# Patient Record
Sex: MALE | Race: WHITE | NOT HISPANIC OR LATINO | Employment: OTHER | URBAN - NONMETROPOLITAN AREA
[De-identification: names, ages, dates, MRNs, and addresses within clinical notes are randomized per-mention and may not be internally consistent; named-entity substitution may affect disease eponyms.]

---

## 2018-05-22 ENCOUNTER — OFFICE VISIT CONVERTED (OUTPATIENT)
Dept: FAMILY MEDICINE CLINIC | Age: 73
End: 2018-05-22
Attending: NURSE PRACTITIONER

## 2018-09-21 ENCOUNTER — OFFICE VISIT CONVERTED (OUTPATIENT)
Dept: FAMILY MEDICINE CLINIC | Age: 73
End: 2018-09-21
Attending: NURSE PRACTITIONER

## 2021-05-18 NOTE — PROGRESS NOTES
AshuKian 1945     Office/Outpatient Visit    Visit Date: Tue, May 22, 2018 11:31 am    Provider: Sheri Mayo N.P. (Assistant: Shelly Bustos MA)    Location: [facility]        Electronically signed by Sheri Mayo N.P. on  2018 12:13:40 PM                             SUBJECTIVE:        CC:     Miles is a 72 year old White male.  cough         HPI:         Patient complains of upper respiratory illness.  These have been present for the past 2 weeks.  The symptoms include chest congestion, dry cough, nasal congestion and yellow nasal discharge.  He has already tried to relieve the symptoms with Mucinex.  Medical history is significant for mild seasonal allergies.      ROS:     CONSTITUTIONAL:  Positive for fever ( low grade ).  2 weeks ago     E/N/T:  Positive for scratchy throat.      CARDIOVASCULAR:  Negative for chest pain, palpitations, tachycardia, orthopnea, and edema.      RESPIRATORY:  Positive for frequent wheezing.   Negative for dyspnea.          PM/FM/SH:     Last Reviewed on 2018 11:38 AM by Sheri Mayo    Past Medical History:         Hyperlipidemia    Hypertension    Myocardial Infarction         CURRENT MEDICAL PROVIDERS:    Cardiologist         PREVENTIVE HEALTH MAINTENANCE             COLONOSCOPY: with normal results Dr. Villagran     INFLUENZA VACCINE: declines, understands reason for immunization     Prevnar 13: declines, understands reason for immunization     PNEUMOCOCCAL 23 VACCINE: declines, understands reason for immunization     Td VACCINE: was last done          Surgical History:         Sinusectomy: 2016;      PTCA (Coronary Angioplasty): X 2;         Family History:     Father:  at age 58;  Myocardial Infarction     Mother:  at age 72;  COPD         Social History:     Occupation: (Self-Employed) farming     Marital Status:      Children: 2 children         Tobacco/Alcohol/Supplements:     Last Reviewed on 2018  11:34 AM by Shelly Bustos    Tobacco: He has never smoked.          Alcohol: Frequency: Socially             Immunizations:     Td adult 1/16/2012         Allergies:     Last Reviewed on 5/22/2018 11:34 AM by Shelly Bustos    Levaquin:        Current Medications:     Last Reviewed on 5/22/2018 11:44 AM by Sheri Mayo    Aspirin (ASA) 81mg Chewable Tablet Chew 1 tablet(s) by mouth qam     Crestor 40mg Tablet Take 1 tablet(s) by mouth daily     b12     Metoprolol Tartrate 25mg Tablet 1/2 tab 2x daily     Zetia 10mg Tablet Take 1 tablet(s) by mouth daily         OBJECTIVE:        Vitals:         Current: 5/22/2018 11:33:50 AM    Ht:  5 ft, 4.75 in;  Wt: 188 lbs;  BMI: 31.5    T: 98.1 F (oral);  BP: 127/60 mm Hg (left arm, sitting);  P: 68 bpm (left arm (BP Cuff), sitting);  sCr: 0.82 mg/dL;  GFR: 78.56        Exams:     PHYSICAL EXAM:     GENERAL:  well developed and nourished; appropriately groomed; in no apparent distress;     E/N/T: EARS:  normal external auditory canals and tympanic membranes;  grossly normal hearing; NOSE: bilateral maxillary sinus tenderness present; OROPHARYNX:  normal mucosa, dentition, gingiva, and posterior pharynx;     RESPIRATORY: normal respiratory rate and pattern with no distress; diffuse inspiratory and expiratory wheezes; heard faintly     CARDIOVASCULAR: normal rate; rhythm is regular;  no systolic murmur;     LYMPHATIC: no enlargement of cervical or facial nodes;         ASSESSMENT           466.0   J20.8  Bronchitis, acute              DDx:         ORDERS:         Meds Prescribed:       Zithromax (Azithromycin) 250mg Tablet 2 tablets on day 1, then 1 tablet on days 2-5.  #6 (Six) tablet(s) Refills: 0       Medrol (Methylprednisolone) 4mg Dosepak Take as directed with food  #1 (One) dose pack Refills: 0                 PLAN:          Bronchitis, acute switch to mucinex DM, discussed allergen exposure and avoidance, he is a farmer, consider an inhaler if needed, he has  never used one          RECOMMENDATIONS given include: rest and increase oral fluid intake.      FOLLOW-UP: Advised to call if there is no improvement in 4 day(s).            Prescriptions:       Zithromax (Azithromycin) 250mg Tablet 2 tablets on day 1, then 1 tablet on days 2-5.  #6 (Six) tablet(s) Refills: 0       Medrol (Methylprednisolone) 4mg Dosepak Take as directed with food  #1 (One) dose pack Refills: 0             CHARGE CAPTURE           **Please note: ICD descriptions below are intended for billing purposes only and may not represent clinical diagnoses**        Primary Diagnosis:         466.0 Bronchitis, acute            J20.8    Acute bronchitis due to other specified organisms              Orders:          98647   Office/outpatient visit; established patient, level 3  (In-House)

## 2021-05-18 NOTE — PROGRESS NOTES
Kian Wakefield 1945     Office/Outpatient Visit    Visit Date: Fri, Sep 21, 2018 09:45 am    Provider: Rizwan Fajardo N.P. (Assistant: Viridiana Woods MA)    Location: [facility]        Electronically signed by Rizwan Fajardo N.P. on  09/21/2018 10:19:40 AM                             SUBJECTIVE:        CC:     Miles is a 72 year old White male.  presents today due to low back pain flare up         HPI:         Back pain noted.  Reason for visit: Pain.  The discomfort is most prominent in the lower, left lumbar spine.  This radiates to the left anterior thigh.  He characterizes it as mild in severity, moderate in intensity, aching, and shooting.  This is a chronic, but intermittent problem with an acute exacerbation.  He states that the current episode of pain started 2 weeks ago.  He does not recall any precipitating event or injury.  Aggravating factors contributing to the back pain may be pushing a heavy object and pulling a load.  Associated symptoms include radicular leg pain.  He denies fever, numbness in the arms, numbness in the legs, radicular arm pain, sexual dysfunction, weakness in the arms or weakness of the legs.  Medical history is significant for MRI of neck and back about 1 year ago at [facility] , was told he needed back and neck surgery at the time but never followed up on it.      ROS:     CONSTITUTIONAL:  Negative for chills, fatigue, fever and weight change.      CARDIOVASCULAR:  Negative for chest pain, orthopnea, paroxysmal nocturnal dyspnea and pedal edema.      RESPIRATORY:  Negative for dyspnea and cough.      GASTROINTESTINAL:  Negative for abdominal pain, heartburn, constipation, diarrhea, and stool changes.      MUSCULOSKELETAL:  Positive for back pain ( chronic; recurrent ).      PSYCHIATRIC:  Negative for anxiety and depression.          PMH/FMH/SH:     Last Reviewed on 9/21/2018 10:03 AM by Rizwan Fajardo    Past Medical History:             PAST MEDICAL  HISTORY         Hyperlipidemia    Hypertension    Myocardial Infarction    heart murmur since 18 y/o         CURRENT MEDICAL PROVIDERS:    Cardiologist         PREVENTIVE HEALTH MAINTENANCE             COLORECTAL CANCER SCREENING: Up to date (colonoscopy q10y; sigmoidoscopy q5y; Cologuard q3y) was last done 2015, Results are in chart; colonoscopy with the following abnormalities noted-- diverticulosis         Surgical History:         Sinusectomy: 2016;      PTCA (Coronary Angioplasty): X 2;         Family History:     Father:  at age 58;  Myocardial Infarction     Mother:  at age 72;  COPD         Social History:     Occupation: (Self-Employed) [employer]     Marital Status:      Children: 2 children         Tobacco/Alcohol/Supplements:     Last Reviewed on 2018 10:03 AM by Rizwan Fajardo    Tobacco: He has never smoked.          Alcohol: Frequency: Socially             Current Problems:     Last Reviewed on 2018 10:03 AM by Rizwan Fajardo    Hyperlipidemia     Erectile dysfunction     CAD         Immunizations:     Td adult 2012         Allergies:     Last Reviewed on 2018 10:03 AM by Rizwan Fajardo    Levaquin:        Current Medications:     Last Reviewed on 2018 10:03 AM by Rizwan Fajardo    Aspirin (ASA) 81mg Chewable Tablet Chew 1 tablet(s) by mouth qam     Crestor 40mg Tablet Take 1 tablet(s) by mouth daily     b12     Metoprolol Tartrate 25mg Tablet 1/2 tab 2x daily     Zetia 10mg Tablet Take 1 tablet(s) by mouth daily         OBJECTIVE:        Vitals:         Current: 2018 9:50:49 AM    Ht:  5 ft, 4.75 in;  Wt: 185 lbs;  BMI: 31.0    T: 98.3 F (oral);  BP: 119/64 mm Hg (right arm, sitting);  P: 66 bpm (right arm (BP Cuff), sitting);  sCr: 0.82 mg/dL;  GFR: 78.02        Exams:     PHYSICAL EXAM:     GENERAL: Vitals recorded well developed, well nourished;  well groomed;  no apparent distress;     RESPIRATORY: normal respiratory rate and pattern  with no distress; normal breath sounds with no rales, rhonchi, wheezes or rubs;     CARDIOVASCULAR: normal rate; rhythm is regular;  normal S1; normal S2; no systolic murmur; no cyanosis; no edema;     MUSCULOSKELETAL: Low back pain, SLR Positivie 75 degrees left leg, Right SLR neg. DTR wnl;     PSYCHIATRIC:  appropriate affect and demeanor; normal speech pattern; grossly normal memory;         ASSESSMENT:           724.5   M54.5  Back pain              DDx:         ORDERS:         Meds Prescribed:       Prednisone 10mg Tablet 3 qd x 3 days, then 2 qd x 3 dasy, then 1 qd x 3 days.  #18 (Eighteen) tablet(s) Refills: 0       Baclofen 10mg Tablet 1 tab po TID prn for pain/muscle pain  #60 (Sixty) tablet(s) Refills: 1                 PLAN:          Back pain Will get copy of MRI of C and L spine and make recommendations from there, may need to see [facility] or [facility] pain center for spinal injections dmt         RECOMMENDATIONS given include: Further recommendation to be given after test results are complete.      FOLLOW-UP: Schedule follow-up appointments on a p.r.n. basis..            Prescriptions:       Prednisone 10mg Tablet 3 qd x 3 days, then 2 qd x 3 dasy, then 1 qd x 3 days.  #18 (Eighteen) tablet(s) Refills: 0       Baclofen 10mg Tablet 1 tab po TID prn for pain/muscle pain  #60 (Sixty) tablet(s) Refills: 1             Patient Recommendations:        For  Back pain:     Schedule follow-up appointments as needed.                APPOINTMENT INFORMATION:        Monday Tuesday Wednesday Thursday Friday Saturday Sunday            Time:___________________AM  PM   Date:_____________________             CHARGE CAPTURE:           Primary Diagnosis:     724.5 Back pain            M54.5    Low back pain              Orders:          [id number]   Office/outpatient visit; established patient, level 3  (In-House)

## 2021-07-01 VITALS
BODY MASS INDEX: 30.82 KG/M2 | SYSTOLIC BLOOD PRESSURE: 119 MMHG | WEIGHT: 185 LBS | DIASTOLIC BLOOD PRESSURE: 64 MMHG | HEART RATE: 66 BPM | TEMPERATURE: 98.3 F | HEIGHT: 65 IN

## 2021-07-01 VITALS
HEIGHT: 65 IN | TEMPERATURE: 98.1 F | HEART RATE: 68 BPM | SYSTOLIC BLOOD PRESSURE: 127 MMHG | DIASTOLIC BLOOD PRESSURE: 60 MMHG | WEIGHT: 188 LBS | BODY MASS INDEX: 31.32 KG/M2

## 2022-02-22 ENCOUNTER — OFFICE VISIT (OUTPATIENT)
Dept: FAMILY MEDICINE CLINIC | Age: 77
End: 2022-02-22

## 2022-02-22 ENCOUNTER — LAB (OUTPATIENT)
Dept: LAB | Facility: HOSPITAL | Age: 77
End: 2022-02-22

## 2022-02-22 VITALS
HEART RATE: 54 BPM | TEMPERATURE: 98 F | SYSTOLIC BLOOD PRESSURE: 148 MMHG | DIASTOLIC BLOOD PRESSURE: 64 MMHG | WEIGHT: 183 LBS | HEIGHT: 65 IN | BODY MASS INDEX: 30.49 KG/M2

## 2022-02-22 DIAGNOSIS — S69.91XA INJURY OF FINGER OF RIGHT HAND, INITIAL ENCOUNTER: ICD-10-CM

## 2022-02-22 DIAGNOSIS — Z12.5 SCREENING FOR MALIGNANT NEOPLASM OF PROSTATE: ICD-10-CM

## 2022-02-22 DIAGNOSIS — Z00.00 ROUTINE GENERAL MEDICAL EXAMINATION AT A HEALTH CARE FACILITY: Primary | ICD-10-CM

## 2022-02-22 DIAGNOSIS — Z11.59 SCREENING FOR VIRAL DISEASE: ICD-10-CM

## 2022-02-22 DIAGNOSIS — Z12.11 SCREENING FOR COLON CANCER: ICD-10-CM

## 2022-02-22 DIAGNOSIS — Z23 ENCOUNTER FOR IMMUNIZATION: ICD-10-CM

## 2022-02-22 DIAGNOSIS — Z13.6 SCREENING FOR CARDIOVASCULAR CONDITION: ICD-10-CM

## 2022-02-22 DIAGNOSIS — H92.01 OTALGIA, RIGHT: ICD-10-CM

## 2022-02-22 PROBLEM — Z95.5 STENTED CORONARY ARTERY: Status: ACTIVE | Noted: 2021-02-08

## 2022-02-22 PROBLEM — I21.9 MYOCARDIAL INFARCTION: Status: ACTIVE | Noted: 2022-02-22

## 2022-02-22 PROBLEM — I25.10 ARTERIOSCLEROSIS OF CORONARY ARTERY: Status: ACTIVE | Noted: 2022-02-22

## 2022-02-22 PROBLEM — E78.5 HYPERLIPIDEMIA: Status: ACTIVE | Noted: 2022-02-22

## 2022-02-22 PROBLEM — J30.2 SEASONAL ALLERGIC RHINITIS: Status: ACTIVE | Noted: 2022-02-22

## 2022-02-22 LAB
ALBUMIN SERPL-MCNC: 5 G/DL (ref 3.5–5.2)
ALBUMIN/GLOB SERPL: 2.8 G/DL
ALP SERPL-CCNC: 65 U/L (ref 39–117)
ALT SERPL W P-5'-P-CCNC: 29 U/L (ref 1–41)
ANION GAP SERPL CALCULATED.3IONS-SCNC: 12.9 MMOL/L (ref 5–15)
AST SERPL-CCNC: 21 U/L (ref 1–40)
BASOPHILS # BLD AUTO: 0.03 10*3/MM3 (ref 0–0.2)
BASOPHILS NFR BLD AUTO: 0.4 % (ref 0–1.5)
BILIRUB SERPL-MCNC: 0.6 MG/DL (ref 0–1.2)
BUN SERPL-MCNC: 11 MG/DL (ref 8–23)
BUN/CREAT SERPL: 15.9 (ref 7–25)
CALCIUM SPEC-SCNC: 9.9 MG/DL (ref 8.6–10.5)
CHLORIDE SERPL-SCNC: 100 MMOL/L (ref 98–107)
CHOLEST SERPL-MCNC: 158 MG/DL (ref 0–200)
CO2 SERPL-SCNC: 26.1 MMOL/L (ref 22–29)
CREAT SERPL-MCNC: 0.69 MG/DL (ref 0.76–1.27)
DEPRECATED RDW RBC AUTO: 44.9 FL (ref 37–54)
EOSINOPHIL # BLD AUTO: 0.34 10*3/MM3 (ref 0–0.4)
EOSINOPHIL NFR BLD AUTO: 4.6 % (ref 0.3–6.2)
ERYTHROCYTE [DISTWIDTH] IN BLOOD BY AUTOMATED COUNT: 13.3 % (ref 12.3–15.4)
GFR SERPL CREATININE-BSD FRML MDRD: 112 ML/MIN/1.73
GLOBULIN UR ELPH-MCNC: 1.8 GM/DL
GLUCOSE SERPL-MCNC: 79 MG/DL (ref 65–99)
HCT VFR BLD AUTO: 47.5 % (ref 37.5–51)
HDLC SERPL-MCNC: 71 MG/DL (ref 40–60)
HGB BLD-MCNC: 15.5 G/DL (ref 13–17.7)
IMM GRANULOCYTES # BLD AUTO: 0.01 10*3/MM3 (ref 0–0.05)
IMM GRANULOCYTES NFR BLD AUTO: 0.1 % (ref 0–0.5)
LDLC SERPL CALC-MCNC: 67 MG/DL (ref 0–100)
LDLC/HDLC SERPL: 0.91 {RATIO}
LYMPHOCYTES # BLD AUTO: 3.1 10*3/MM3 (ref 0.7–3.1)
LYMPHOCYTES NFR BLD AUTO: 41.6 % (ref 19.6–45.3)
MCH RBC QN AUTO: 29.4 PG (ref 26.6–33)
MCHC RBC AUTO-ENTMCNC: 32.6 G/DL (ref 31.5–35.7)
MCV RBC AUTO: 90.1 FL (ref 79–97)
MONOCYTES # BLD AUTO: 0.69 10*3/MM3 (ref 0.1–0.9)
MONOCYTES NFR BLD AUTO: 9.2 % (ref 5–12)
NEUTROPHILS NFR BLD AUTO: 3.29 10*3/MM3 (ref 1.7–7)
NEUTROPHILS NFR BLD AUTO: 44.1 % (ref 42.7–76)
PLATELET # BLD AUTO: 280 10*3/MM3 (ref 140–450)
PMV BLD AUTO: 8.9 FL (ref 6–12)
POTASSIUM SERPL-SCNC: 3.9 MMOL/L (ref 3.5–5.2)
PROT SERPL-MCNC: 6.8 G/DL (ref 6–8.5)
RBC # BLD AUTO: 5.27 10*6/MM3 (ref 4.14–5.8)
SODIUM SERPL-SCNC: 139 MMOL/L (ref 136–145)
TRIGL SERPL-MCNC: 112 MG/DL (ref 0–150)
VLDLC SERPL-MCNC: 20 MG/DL (ref 5–40)
WBC NRBC COR # BLD: 7.46 10*3/MM3 (ref 3.4–10.8)

## 2022-02-22 PROCEDURE — 99213 OFFICE O/P EST LOW 20 MIN: CPT | Performed by: NURSE PRACTITIONER

## 2022-02-22 PROCEDURE — 90670 PCV13 VACCINE IM: CPT | Performed by: NURSE PRACTITIONER

## 2022-02-22 PROCEDURE — 85025 COMPLETE CBC W/AUTO DIFF WBC: CPT

## 2022-02-22 PROCEDURE — G0438 PPPS, INITIAL VISIT: HCPCS | Performed by: NURSE PRACTITIONER

## 2022-02-22 PROCEDURE — G0009 ADMIN PNEUMOCOCCAL VACCINE: HCPCS | Performed by: NURSE PRACTITIONER

## 2022-02-22 PROCEDURE — 80053 COMPREHEN METABOLIC PANEL: CPT

## 2022-02-22 PROCEDURE — 36415 COLL VENOUS BLD VENIPUNCTURE: CPT

## 2022-02-22 PROCEDURE — 80061 LIPID PANEL: CPT

## 2022-02-22 PROCEDURE — 1159F MED LIST DOCD IN RCRD: CPT | Performed by: NURSE PRACTITIONER

## 2022-02-22 PROCEDURE — 1170F FXNL STATUS ASSESSED: CPT | Performed by: NURSE PRACTITIONER

## 2022-02-22 RX ORDER — ROSUVASTATIN CALCIUM 40 MG/1
40 TABLET, COATED ORAL DAILY
COMMUNITY

## 2022-02-22 RX ORDER — ASPIRIN 81 MG/1
81 TABLET ORAL DAILY
COMMUNITY

## 2022-02-22 RX ORDER — ZINC GLUCONATE 50 MG
1 TABLET ORAL DAILY
COMMUNITY

## 2022-02-22 RX ORDER — EZETIMIBE 10 MG/1
1 TABLET ORAL DAILY
COMMUNITY
Start: 2022-01-10

## 2022-02-22 NOTE — ASSESSMENT & PLAN NOTE
Hypertension is unchanged.  Continue current treatment regimen.  Continue current medications.  Blood pressure will be reassessed follow up with cardiology as recommended .

## 2022-02-22 NOTE — PROGRESS NOTES
The ABCs of the Annual Wellness Visit  Subsequent Medicare Wellness Visit    Chief Complaint   Patient presents with   • Establish Care     labs   • Earache     right ear pain    • Medicare Wellness-subsequent      Subjective    History of Present Illness:  Kian Wakefield is a 76 y.o. male who presents for a Subsequent Medicare Wellness Visit.    The following portions of the patient's history were reviewed and   updated as appropriate: allergies, current medications, past family history, past medical history, past social history, past surgical history and problem list.    Compared to one year ago, the patient feels his physical   health is the same.    Compared to one year ago, the patient feels his mental   health is the same.      Regarding otalgia, has been off and on for a few weeks.  There is no change in hearing , only feeling like pressure in his ear.  He does have some occasional seasonal allergies.  No trauma or previous ear complaints.  No drainage    Mr. Wakefield reports that about a month ago, he stuck a twig into his finger and smashed his finger in between 2 logs while trying to move  and has been having pain since that time.  He does have pain running down into the hand and lower arm with certain movements of the finger.  He is concerned that he has an infection.  He denies any redness, warmth or drainage.  No open areas are noted.      Recent Hospitalizations:  He was not admitted to the hospital during the last year.       Current Medical Providers:  Patient Care Team:  Raissa Patiño APRN as PCP - General (Nurse Practitioner)  Oral Poe MD as Consulting Physician (Cardiology)    Outpatient Medications Prior to Visit   Medication Sig Dispense Refill   • aspirin 81 MG EC tablet Take 81 mg by mouth Daily.     • cyanocobalamin (VITAMIN B-12) 1000 MCG tablet Take 1 tablet by mouth Daily.     • ezetimibe (ZETIA) 10 MG tablet Take 1 tablet by mouth Daily.     • metoprolol tartrate (LOPRESSOR) 25  "MG tablet 1/2 tablet daily     • rosuvastatin (CRESTOR) 40 MG tablet Take 40 mg by mouth Daily.     • Zinc 50 MG tablet Take 1 tablet by mouth Daily.     • Aspirin Buf,CaCarb-MgCarb-MgO, 81 MG tablet Take 1 tablet by mouth Daily.       No facility-administered medications prior to visit.       No opioid medication identified on active medication list. I have reviewed chart for other potential  high risk medication/s and harmful drug interactions in the elderly.          Aspirin is on active medication list. Aspirin use is indicated based on review of current medical condition/s. Pros and cons of this therapy have been discussed today. Benefits of this medication outweigh potential harm.  Patient has been encouraged to continue taking this medication.  .      Patient Active Problem List   Diagnosis   • Atherosclerotic heart disease of native coronary artery without angina pectoris   • Arteriosclerosis of coronary artery   • Hyperlipidemia   • Hypercholesterolemia   • Hypertension   • Myocardial infarction (HCC)   • Seasonal allergic rhinitis   • Stented coronary artery     Advance Care Planning  Advance Directive is not on file.  ACP discussion was held with the patient during this visit. Patient does not have an advance directive, information provided.    Review of Systems   Constitutional: Negative for fatigue.   HENT: Positive for ear pain (right).    Eyes: Negative for visual disturbance.   Respiratory: Negative for shortness of breath.    Cardiovascular: Negative for chest pain.   Gastrointestinal: Negative for abdominal pain, constipation and diarrhea.   Musculoskeletal: Positive for arthralgias (right middle finger post traumatic ).        Objective    Vitals:    02/22/22 1323 02/22/22 1331   BP: 162/65 148/64   BP Location: Left arm Left arm   Patient Position: Sitting Sitting   Pulse: 56 54   Temp: 98 °F (36.7 °C)    Weight: 83 kg (183 lb)    Height: 164.5 cm (64.76\")      BMI Readings from Last 1 " Encounters:   02/22/22 30.68 kg/m²   BMI is above normal parameters. Recommendations include: educational material    Does the patient have evidence of cognitive impairment? No    Physical Exam  Vitals reviewed.   Constitutional:       Appearance: Normal appearance.   HENT:      Head: Normocephalic.      Right Ear: Tympanic membrane is scarred.   Eyes:      Pupils: Pupils are equal, round, and reactive to light.   Cardiovascular:      Rate and Rhythm: Normal rate and regular rhythm.      Heart sounds: No murmur heard.      Pulmonary:      Effort: Pulmonary effort is normal.      Breath sounds: Normal breath sounds.   Musculoskeletal:         General: Normal range of motion.   Neurological:      Mental Status: He is alert.   Psychiatric:         Mood and Affect: Mood normal.         Behavior: Behavior normal.       Lab Results   Component Value Date    TRIG 112 02/22/2022    HDL 71 (H) 02/22/2022    LDL 67 02/22/2022    VLDL 20 02/22/2022            HEALTH RISK ASSESSMENT    Smoking Status:  Social History     Tobacco Use   Smoking Status Never Smoker   Smokeless Tobacco Never Used     Alcohol Consumption:  Social History     Substance and Sexual Activity   Alcohol Use Yes    Comment: whiskey nightly   - 2 drinks per night     Fall Risk Screen:    STEADI Fall Risk Assessment was completed, and patient is at LOW risk for falls.Assessment completed on:2/22/2022    Depression Screening:  PHQ-2/PHQ-9 Depression Screening 2/22/2022   Little interest or pleasure in doing things 0   Feeling down, depressed, or hopeless 0   Total Score 0       Health Habits and Functional and Cognitive Screening:  No flowsheet data found.    Age-appropriate Screening Schedule:  Refer to the list below for future screening recommendations based on patient's age, sex and/or medical conditions. Orders for these recommended tests are listed in the plan section. The patient has been provided with a written plan.    Health Maintenance   Topic Date  Due   • ZOSTER VACCINE (1 of 2) Never done   • INFLUENZA VACCINE  02/22/2023 (Originally 8/1/2021)   • LIPID PANEL  02/22/2023   • TDAP/TD VACCINES (2 - Td or Tdap) 02/22/2031              Assessment/Plan   CMS Preventative Services Quick Reference  Risk Factors Identified During Encounter  Alcohol Misuse  Cardiovascular Disease  Chronic Pain   Hearing Problem  Immunizations Discussed/Encouraged (specific Immunizations; Influenza and Shingrix  The above risks/problems have been discussed with the patient.  Follow up actions/plans if indicated are seen below in the Assessment/Plan Section.  Pertinent information has been shared with the patient in the After Visit Summary.    Diagnoses and all orders for this visit:    1. Routine general medical examination at a health care facility (Primary)  Comments:  diet and exercise, vaccines discussed and administered as allowed by Mr. Wakefield  Annual wellness visits recommended     2. Injury of finger of right hand, initial encounter  Comments:  no evidence of infection - would suspect tendonitis vs possible crush/nerve damage  declines referral at this time pending lab results   Orders:  -     CBC & Differential; Future    3. Otalgia, right  Comments:  no evidence of infection   would recommend that he use OTC antihistamines and nasal sprays in short term - follow up if no improvement or change in symptom    4. Screening for cardiovascular condition  -     Comprehensive Metabolic Panel; Future  -     Lipid Panel; Future    5. Encounter for immunization  -     Pneumococcal Conjugate Vaccine 13-Valent All    6. Screening for viral disease    7. Screening for colon cancer  -     Ambulatory Referral For Screening Colonoscopy    8. Screening for malignant neoplasm of prostate  -     PSA SCREENING; Future        Follow Up:   Return in about 1 year (around 2/22/2023), or if symptoms worsen or fail to improve, for Medicare Wellness.     An After Visit Summary and PPPS were made  available to the patient.

## 2022-03-02 ENCOUNTER — TELEPHONE (OUTPATIENT)
Dept: FAMILY MEDICINE CLINIC | Age: 77
End: 2022-03-02

## 2022-03-02 NOTE — TELEPHONE ENCOUNTER
jamila     Caller: Kian Wakefield    Relationship: Self    Best call back number: [phone] PATIENT [phone]    What is the best time to reach you: ANY     Who are you requesting to speak with (clinical staff, provider,  specific staff member):CLINICAL         What was the call regarding: WIFE CALLED IN STATING THAT SHE  RECEIVED A CALL FROM THE [facility] [phone] AND WHEN SHE CALLED BACK THE HOSPITAL WAS UNABLE TO ASSIST HER BECAUSE THEY DIDN'T HAVE ANY IDEA AS TO WHY SHE WAS CALLING     WIFE THINKS IT IS REGARDING A CONSULTATION OR ABOUT A COLONOSCOPY. WIFE IS REQUESTING A PHONE NUMBER OF WHO NEEDS TO BE CONTACTED REGARDING THIS.     Do you require a callback: YES         MARCO ANTONIO WAKEFIELD IS ON THE  VERBAL.

## 2022-03-02 NOTE — TELEPHONE ENCOUNTER
Patient has been informed to call Dr. Juan Lundberg's office [phone] opt #1 to [facility]. Consultation.    brittney

## 2022-04-26 ENCOUNTER — TELEPHONE (OUTPATIENT)
Dept: FAMILY MEDICINE CLINIC | Age: 77
End: 2022-04-26

## 2022-05-02 ENCOUNTER — LAB (OUTPATIENT)
Dept: LAB | Facility: HOSPITAL | Age: 77
End: 2022-05-02

## 2022-05-02 DIAGNOSIS — Z12.5 SCREENING FOR MALIGNANT NEOPLASM OF PROSTATE: ICD-10-CM

## 2022-05-02 LAB — PSA SERPL-MCNC: 4.79 NG/ML (ref 0–4)

## 2022-05-02 PROCEDURE — G0103 PSA SCREENING: HCPCS

## 2022-05-02 PROCEDURE — 36415 COLL VENOUS BLD VENIPUNCTURE: CPT

## 2023-02-28 ENCOUNTER — OFFICE VISIT (OUTPATIENT)
Dept: FAMILY MEDICINE CLINIC | Age: 78
End: 2023-02-28
Payer: MEDICARE

## 2023-02-28 ENCOUNTER — TELEPHONE (OUTPATIENT)
Dept: FAMILY MEDICINE CLINIC | Age: 78
End: 2023-02-28
Payer: MEDICARE

## 2023-02-28 ENCOUNTER — LAB (OUTPATIENT)
Dept: LAB | Facility: HOSPITAL | Age: 78
End: 2023-02-28
Payer: MEDICARE

## 2023-02-28 VITALS
TEMPERATURE: 98.2 F | SYSTOLIC BLOOD PRESSURE: 150 MMHG | HEIGHT: 65 IN | WEIGHT: 178.2 LBS | BODY MASS INDEX: 29.69 KG/M2 | HEART RATE: 63 BPM | OXYGEN SATURATION: 97 % | DIASTOLIC BLOOD PRESSURE: 58 MMHG

## 2023-02-28 DIAGNOSIS — Z11.59 SCREENING FOR VIRAL DISEASE: ICD-10-CM

## 2023-02-28 DIAGNOSIS — Z00.00 MEDICARE ANNUAL WELLNESS VISIT, SUBSEQUENT: Primary | ICD-10-CM

## 2023-02-28 DIAGNOSIS — Z13.6 SCREENING FOR CARDIOVASCULAR CONDITION: ICD-10-CM

## 2023-02-28 DIAGNOSIS — N52.9 ERECTILE DYSFUNCTION, UNSPECIFIED ERECTILE DYSFUNCTION TYPE: ICD-10-CM

## 2023-02-28 DIAGNOSIS — Z79.82 LONG TERM CURRENT USE OF ASPIRIN: ICD-10-CM

## 2023-02-28 DIAGNOSIS — Z23 ENCOUNTER FOR IMMUNIZATION: ICD-10-CM

## 2023-02-28 DIAGNOSIS — I10 HYPERTENSION, UNSPECIFIED TYPE: ICD-10-CM

## 2023-02-28 LAB
ALBUMIN SERPL-MCNC: 4.6 G/DL (ref 3.5–5.2)
ALBUMIN/GLOB SERPL: 1.8 G/DL
ALP SERPL-CCNC: 65 U/L (ref 39–117)
ALT SERPL W P-5'-P-CCNC: 21 U/L (ref 1–41)
ANION GAP SERPL CALCULATED.3IONS-SCNC: 9 MMOL/L (ref 5–15)
AST SERPL-CCNC: 24 U/L (ref 1–40)
BILIRUB SERPL-MCNC: 0.6 MG/DL (ref 0–1.2)
BUN SERPL-MCNC: 12 MG/DL (ref 8–23)
BUN/CREAT SERPL: 13.8 (ref 7–25)
CALCIUM SPEC-SCNC: 9.7 MG/DL (ref 8.6–10.5)
CHLORIDE SERPL-SCNC: 101 MMOL/L (ref 98–107)
CHOLEST SERPL-MCNC: 150 MG/DL (ref 0–200)
CO2 SERPL-SCNC: 29 MMOL/L (ref 22–29)
CREAT SERPL-MCNC: 0.87 MG/DL (ref 0.76–1.27)
DEPRECATED RDW RBC AUTO: 44.9 FL (ref 37–54)
EGFRCR SERPLBLD CKD-EPI 2021: 90 ML/MIN/1.73
ERYTHROCYTE [DISTWIDTH] IN BLOOD BY AUTOMATED COUNT: 13.5 % (ref 12.3–15.4)
GLOBULIN UR ELPH-MCNC: 2.5 GM/DL
GLUCOSE SERPL-MCNC: 95 MG/DL (ref 65–99)
HCT VFR BLD AUTO: 45.6 % (ref 37.5–51)
HCV AB SER DONR QL: NORMAL
HDLC SERPL-MCNC: 70 MG/DL (ref 40–60)
HGB BLD-MCNC: 14.9 G/DL (ref 13–17.7)
LDLC SERPL CALC-MCNC: 62 MG/DL (ref 0–100)
LDLC/HDLC SERPL: 0.85 {RATIO}
MCH RBC QN AUTO: 29.3 PG (ref 26.6–33)
MCHC RBC AUTO-ENTMCNC: 32.7 G/DL (ref 31.5–35.7)
MCV RBC AUTO: 89.8 FL (ref 79–97)
PLATELET # BLD AUTO: 296 10*3/MM3 (ref 140–450)
PMV BLD AUTO: 8.2 FL (ref 6–12)
POTASSIUM SERPL-SCNC: 4.6 MMOL/L (ref 3.5–5.2)
PROT SERPL-MCNC: 7.1 G/DL (ref 6–8.5)
RBC # BLD AUTO: 5.08 10*6/MM3 (ref 4.14–5.8)
SODIUM SERPL-SCNC: 139 MMOL/L (ref 136–145)
TRIGL SERPL-MCNC: 102 MG/DL (ref 0–150)
VLDLC SERPL-MCNC: 18 MG/DL (ref 5–40)
WBC NRBC COR # BLD: 7.94 10*3/MM3 (ref 3.4–10.8)

## 2023-02-28 PROCEDURE — 85027 COMPLETE CBC AUTOMATED: CPT

## 2023-02-28 PROCEDURE — 80061 LIPID PANEL: CPT

## 2023-02-28 PROCEDURE — G0009 ADMIN PNEUMOCOCCAL VACCINE: HCPCS | Performed by: NURSE PRACTITIONER

## 2023-02-28 PROCEDURE — 90677 PCV20 VACCINE IM: CPT | Performed by: NURSE PRACTITIONER

## 2023-02-28 PROCEDURE — 1159F MED LIST DOCD IN RCRD: CPT | Performed by: NURSE PRACTITIONER

## 2023-02-28 PROCEDURE — 1170F FXNL STATUS ASSESSED: CPT | Performed by: NURSE PRACTITIONER

## 2023-02-28 PROCEDURE — 80053 COMPREHEN METABOLIC PANEL: CPT

## 2023-02-28 PROCEDURE — 36415 COLL VENOUS BLD VENIPUNCTURE: CPT

## 2023-02-28 PROCEDURE — 86803 HEPATITIS C AB TEST: CPT

## 2023-02-28 PROCEDURE — G0439 PPPS, SUBSEQ VISIT: HCPCS | Performed by: NURSE PRACTITIONER

## 2023-02-28 RX ORDER — NITROGLYCERIN 0.4 MG/1
0.4 TABLET SUBLINGUAL
COMMUNITY
Start: 2023-02-20 | End: 2024-02-20

## 2023-02-28 RX ORDER — SILDENAFIL 50 MG/1
25-50 TABLET, FILM COATED ORAL DAILY PRN
Qty: 20 TABLET | Refills: 1 | Status: SHIPPED | OUTPATIENT
Start: 2023-02-28

## 2023-02-28 NOTE — PROGRESS NOTES
The ABCs of the Annual Wellness Visit  Subsequent Medicare Wellness Visit    Subjective      Kian Wakefield is a 77 y.o. male who presents for a Subsequent Medicare Wellness Visit.    The following portions of the patient's history were reviewed and   updated as appropriate: allergies, current medications, past family history, past medical history, past social history, past surgical history and problem list.    Compared to one year ago, the patient feels his physical   health is better.    Compared to one year ago, the patient feels his mental   health is better.    Is requesting refill on viagra- has been some time since refilled - his MI is from 2003  Overall cardiac conditions are stable.        Recent Hospitalizations:  He was not admitted to the hospital during the last year.       Current Medical Providers:  Patient Care Team:  Raissa Patiño APRN as PCP - General (Nurse Practitioner)  Oral Poe MD as Consulting Physician (Cardiology)    Outpatient Medications Prior to Visit   Medication Sig Dispense Refill   • aspirin 81 MG EC tablet Take 1 tablet by mouth Daily.     • cyanocobalamin (VITAMIN B-12) 1000 MCG tablet Take 1 tablet by mouth Daily.     • ezetimibe (ZETIA) 10 MG tablet Take 1 tablet by mouth Daily.     • metoprolol tartrate (LOPRESSOR) 25 MG tablet 1/2 tablet daily     • rosuvastatin (CRESTOR) 40 MG tablet Take 1 tablet by mouth Daily.     • Zinc 50 MG tablet Take 1 tablet by mouth Daily.     • nitroglycerin (NITROSTAT) 0.4 MG SL tablet Place 1 tablet under the tongue Every 5 (Five) Minutes As Needed.       No facility-administered medications prior to visit.       No opioid medication identified on active medication list. I have reviewed chart for other potential  high risk medication/s and harmful drug interactions in the elderly.          Aspirin is on active medication list. Aspirin use is indicated based on review of current medical condition/s. Pros and cons of this therapy have  "been discussed today. Benefits of this medication outweigh potential harm.  Patient has been encouraged to continue taking this medication.  .      Patient Active Problem List   Diagnosis   • Atherosclerotic heart disease of native coronary artery without angina pectoris   • Arteriosclerosis of coronary artery   • Hyperlipidemia   • Hypercholesterolemia   • Hypertension   • Myocardial infarction (HCC)   • Seasonal allergic rhinitis   • Stented coronary artery     Advance Care Planning  Advance Directive is not on file.  ACP discussion was declined by the patient. Patient does not have an advance directive, declines further assistance.     Objective    Vitals:    02/28/23 1403   BP: 150/58   BP Location: Right arm   Patient Position: Sitting   Cuff Size: Adult   Pulse: 63   Temp: 98.2 °F (36.8 °C)   TempSrc: Oral   SpO2: 97%   Weight: 80.8 kg (178 lb 3.2 oz)   Height: 164.5 cm (64.76\")     Estimated body mass index is 29.87 kg/m² as calculated from the following:    Height as of this encounter: 164.5 cm (64.76\").    Weight as of this encounter: 80.8 kg (178 lb 3.2 oz).    BMI is >= 30 and <35. (Class 1 Obesity). The following options were offered after discussion;: nutrition counseling/recommendations      Does the patient have evidence of cognitive impairment?   No            HEALTH RISK ASSESSMENT    Smoking Status:  Social History     Tobacco Use   Smoking Status Never   Smokeless Tobacco Never     Alcohol Consumption:  Social History     Substance and Sexual Activity   Alcohol Use Yes    Comment: whiskey nightly   - 2 drinks per night     Fall Risk Screen:    ILEANA Fall Risk Assessment was completed, and patient is at LOW risk for falls.Assessment completed on:2/28/2023    Depression Screening:  PHQ-2/PHQ-9 Depression Screening 2/28/2023   Little Interest or Pleasure in Doing Things 0-->not at all   Feeling Down, Depressed or Hopeless 0-->not at all   PHQ-9: Brief Depression Severity Measure Score 0       Health " Habits and Functional and Cognitive Screening:  Functional & Cognitive Status 2/28/2023   Do you have difficulty preparing food and eating? No   Do you have difficulty bathing yourself, getting dressed or grooming yourself? No   Do you have difficulty using the toilet? No   Do you have difficulty moving around from place to place? No   Do you have trouble with steps or getting out of a bed or a chair? No   Current Diet Well Balanced Diet   Dental Exam Up to date   Eye Exam Not up to date   Exercise (times per week) 4 times per week   Current Exercises Include Walking   Do you need help using the phone?  No   Are you deaf or do you have serious difficulty hearing?  No   Do you need help with transportation? No   Do you need help shopping? No   Do you need help preparing meals?  No   Do you need help with housework?  No   Do you need help with laundry? No   Do you need help taking your medications? No   Do you need help managing money? No   Do you ever drive or ride in a car without wearing a seat belt? No   Have you felt unusual stress, anger or loneliness in the last month? No   Who do you live with? Spouse   If you need help, do you have trouble finding someone available to you? No   Have you been bothered in the last four weeks by sexual problems? No   Do you have difficulty concentrating, remembering or making decisions? No       Age-appropriate Screening Schedule:  Refer to the list below for future screening recommendations based on patient's age, sex and/or medical conditions. Orders for these recommended tests are listed in the plan section. The patient has been provided with a written plan.    Health Maintenance   Topic Date Due   • ZOSTER VACCINE (1 of 2) Never done   • HEPATITIS C SCREENING  Never done   • LIPID PANEL  02/22/2023   • INFLUENZA VACCINE  03/31/2023 (Originally 8/1/2022)   • ANNUAL WELLNESS VISIT  02/28/2024   • COLORECTAL CANCER SCREENING  04/06/2027   • TDAP/TD VACCINES (2 - Td or Tdap)  02/22/2031   • COVID-19 Vaccine  Completed   • Pneumococcal Vaccine 65+  Completed                CMS Preventative Services Quick Reference  Risk Factors Identified During Encounter:    Hearing Problem: declines referral at this time  Vision Screening Recommended    The above risks/problems have been discussed with the patient.  Pertinent information has been shared with the patient in the After Visit Summary.    Diagnoses and all orders for this visit:    1. Medicare annual wellness visit, subsequent (Primary)  Comments:  continue with well balanced diet and exercise as tolerated, annual exam, health maintenance recommendations discussed - decline flu vaccine this year     2. Hypertension, unspecified type  Comments:  he follows up with cardiology and declines medication management from this office  forward note to cardiology for recommendations     3. Erectile dysfunction, unspecified erectile dysfunction type  Comments:  discussed contraindication with use of nitro- he is aware and states that he does not use this- advised to discuss with his cardiologist before use  Orders:  -     sildenafil (Viagra) 50 MG tablet; Take 1/2 - 1 tablet by mouth Daily As Needed for Erectile Dysfunction.  Dispense: 20 tablet; Refill: 1    4. Encounter for immunization  -     Pneumococcal Conjugate Vaccine 20-Valent (PCV20)    5. Screening for cardiovascular condition  -     Comprehensive metabolic panel; Future  -     Lipid panel; Future    6. Screening for viral disease  -     Hepatitis C antibody; Future    7. Long term current use of aspirin  -     CBC No Differential; Future        Follow Up:   Next Medicare Wellness visit to be scheduled in 1 year.      An After Visit Summary and PPPS were made available to the patient.

## 2023-03-03 NOTE — TELEPHONE ENCOUNTER
Spoke with Dr. Poe's office to ask if they received the message and she stated she would get in touch with his MA and have her call me back to discuss if this was okay or not - leisa

## 2023-04-10 ENCOUNTER — OFFICE VISIT (OUTPATIENT)
Dept: FAMILY MEDICINE CLINIC | Age: 78
End: 2023-04-10
Payer: MEDICARE

## 2023-04-10 VITALS
OXYGEN SATURATION: 97 % | WEIGHT: 173.4 LBS | DIASTOLIC BLOOD PRESSURE: 62 MMHG | SYSTOLIC BLOOD PRESSURE: 136 MMHG | HEART RATE: 67 BPM | TEMPERATURE: 98.1 F | BODY MASS INDEX: 28.89 KG/M2 | HEIGHT: 65 IN

## 2023-04-10 DIAGNOSIS — M54.50 ACUTE LEFT-SIDED LOW BACK PAIN, UNSPECIFIED WHETHER SCIATICA PRESENT: ICD-10-CM

## 2023-04-10 DIAGNOSIS — S39.012A STRAIN OF LUMBAR REGION, INITIAL ENCOUNTER: Primary | ICD-10-CM

## 2023-04-10 RX ORDER — METHYLPREDNISOLONE 4 MG/1
TABLET ORAL
Qty: 21 TABLET | Refills: 0 | Status: SHIPPED | OUTPATIENT
Start: 2023-04-10

## 2023-04-10 RX ORDER — CYCLOBENZAPRINE HCL 10 MG
10 TABLET ORAL NIGHTLY PRN
Qty: 10 TABLET | Refills: 0 | Status: SHIPPED | OUTPATIENT
Start: 2023-04-10

## 2023-04-10 RX ORDER — TOBRAMYCIN AND DEXAMETHASONE 3; 1 MG/ML; MG/ML
1 SUSPENSION/ DROPS OPHTHALMIC 3 TIMES DAILY
COMMUNITY
Start: 2023-03-17 | End: 2023-04-10
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
## 2024-01-29 ENCOUNTER — OFFICE VISIT (OUTPATIENT)
Dept: FAMILY MEDICINE CLINIC | Age: 79
End: 2024-01-29
Payer: MEDICARE

## 2024-01-29 ENCOUNTER — HOSPITAL ENCOUNTER (OUTPATIENT)
Dept: GENERAL RADIOLOGY | Facility: HOSPITAL | Age: 79
Discharge: HOME | End: 2024-01-29
Admitting: NURSE PRACTITIONER
Payer: MEDICARE

## 2024-01-29 VITALS
HEART RATE: 73 BPM | HEIGHT: 65 IN | OXYGEN SATURATION: 96 % | SYSTOLIC BLOOD PRESSURE: 157 MMHG | BODY MASS INDEX: 29.79 KG/M2 | WEIGHT: 178.8 LBS | DIASTOLIC BLOOD PRESSURE: 97 MMHG

## 2024-01-29 DIAGNOSIS — M54.2 NECK PAIN: ICD-10-CM

## 2024-01-29 DIAGNOSIS — M54.2 NECK PAIN: Primary | ICD-10-CM

## 2024-01-29 DIAGNOSIS — S16.1XXD STRAIN OF NECK MUSCLE, SUBSEQUENT ENCOUNTER: ICD-10-CM

## 2024-01-29 DIAGNOSIS — M25.511 ACUTE PAIN OF RIGHT SHOULDER: ICD-10-CM

## 2024-01-29 PROCEDURE — 72040 X-RAY EXAM NECK SPINE 2-3 VW: CPT

## 2024-01-29 PROCEDURE — 3080F DIAST BP >= 90 MM HG: CPT | Performed by: NURSE PRACTITIONER

## 2024-01-29 PROCEDURE — 99213 OFFICE O/P EST LOW 20 MIN: CPT | Performed by: NURSE PRACTITIONER

## 2024-01-29 PROCEDURE — 1159F MED LIST DOCD IN RCRD: CPT | Performed by: NURSE PRACTITIONER

## 2024-01-29 PROCEDURE — 3077F SYST BP >= 140 MM HG: CPT | Performed by: NURSE PRACTITIONER

## 2024-01-29 PROCEDURE — 1160F RVW MEDS BY RX/DR IN RCRD: CPT | Performed by: NURSE PRACTITIONER

## 2024-01-29 RX ORDER — CYCLOBENZAPRINE HCL 10 MG
10 TABLET ORAL NIGHTLY PRN
Qty: 20 TABLET | Refills: 0 | Status: SHIPPED | OUTPATIENT
Start: 2024-01-29

## 2024-01-29 NOTE — PROGRESS NOTES
Chief Complaint  Kian Wakefield presents to [facility] for Back Pain (X1 year back, growth issues within top half of back. Was down for 3 days. Pain within back of skull & down. )      Subjective     History of Present Illness    Since last visit 4/10/2023:  >4/10/23- TIM Schmidt, PCP - lower back strain  conservative therapy , medrol pack, flexeril given - resolved     Neck Pain: Paitent complains of neck pain. Event that precipitate these symptoms: none known, woke up and neck was locked and extremely painful  . Onset of symptoms 3 days ago, gradually improving since that time. Current symptoms are pain in right side of neck and radiating down into the shoulder and wrapping around his right side  (dull and tight band in character; 9/10 in severity) and stiffness in neck and shoulder (but shoulder was much better this morning ) . Patient denies numbness in right arm, paresthesias in right arm, and weakness in right arm and shoulder  . Patient has had recurrent self limited episodes of neck pain in the past.  Previous treatments include: medication: tylenol and had muscle relaxer in past for his low back  .      Assessment and Plan       Diagnoses and all orders for this visit:    1. Neck pain (Primary)  Comments:  suspect trapezius strain/ muscle spasm  Will check Xray before starting PT - no redflags on exam today  Orders:  -     XR Spine Cervical 2 or 3 View; Future  -     cyclobenzaprine (FLEXERIL) 10 MG tablet; Take 1 tablet by mouth At Night As Needed for Muscle Spasms.  Dispense: 20 tablet; Refill: 0  -     Diclofenac Sodium (VOLTAREN) 1 % gel gel; Apply 4 g topically to the appropriate area as directed 4 (Four) Times a Day As Needed (neck and back pain).  Dispense: 50 g; Refill: 1  -     Ambulatory Referral to Physical Therapy Evaluate and treat    2. Acute pain of right shoulder  Comments:  suspect strain in the neck / trapezius muscle causing much of his symtpoms,  " discussed PT and he is agreeable.  Orders:  -     XR Spine Cervical 2 or 3 View; Future  -     cyclobenzaprine (FLEXERIL) 10 MG tablet; Take 1 tablet by mouth At Night As Needed for Muscle Spasms.  Dispense: 20 tablet; Refill: 0  -     Ambulatory Referral to Physical Therapy Evaluate and treat    3. Strain of neck muscle, subsequent encounter  -     Ambulatory Referral to Physical Therapy Evaluate and treat            Follow Up   Return if symptoms worsen or fail to improve.  Future Appointments   Date Time Provider Department Center   3/1/2024  9:15 AM Raissa Patiño APRN [facility]       New Medications Ordered This Visit   Medications    cyclobenzaprine (FLEXERIL) 10 MG tablet     Sig: Take 1 tablet by mouth At Night As Needed for Muscle Spasms.     Dispense:  20 tablet     Refill:  0    Diclofenac Sodium (VOLTAREN) 1 % gel gel     Sig: Apply 4 g topically to the appropriate area as directed 4 (Four) Times a Day As Needed (neck and back pain).     Dispense:  50 g     Refill:  1       Medications Discontinued During This Encounter   Medication Reason    cyclobenzaprine (FLEXERIL) 10 MG tablet Reorder          Review of Systems    Objective     Vitals:    01/29/24 1244   BP: 157/97   BP Location: Left arm   Patient Position: Sitting   Pulse: 73   SpO2: 96%   Weight: 81.1 kg (178 lb 12.8 oz)   Height: 164.5 cm (64.76\")     Body mass index is 29.97 kg/m².          Physical Exam  Vitals reviewed.   Constitutional:       Appearance: Normal appearance.   HENT:      Head: Normocephalic.   Eyes:      Pupils: Pupils are equal, round, and reactive to light.   Cardiovascular:      Rate and Rhythm: Normal rate and regular rhythm.      Heart sounds: Murmur heard.   Pulmonary:      Effort: Pulmonary effort is normal.      Breath sounds: Normal breath sounds.   Musculoskeletal:         General: Normal range of motion.        Arms:       Cervical back: Tenderness present. Pain with movement present. Decreased range of " motion.   Neurological:      Mental Status: He is alert.   Psychiatric:         Mood and Affect: Mood normal.         Behavior: Behavior normal.            Result Review               Allergies   Allergen Reactions    Levofloxacin Swelling      History reviewed. No pertinent past medical history.  Current Outpatient Medications   Medication Sig Dispense Refill    aspirin 81 MG EC tablet Take 1 tablet by mouth Daily.      cyanocobalamin (VITAMIN B-12) 1000 MCG tablet Take 1 tablet by mouth Daily.      cyclobenzaprine (FLEXERIL) 10 MG tablet Take 1 tablet by mouth At Night As Needed for Muscle Spasms. 20 tablet 0    ezetimibe (ZETIA) 10 MG tablet Take 1 tablet by mouth Daily.      metoprolol tartrate (LOPRESSOR) 25 MG tablet 2 (Two) Times a Day. 1/2 tablet daily      rosuvastatin (CRESTOR) 40 MG tablet Take 1 tablet by mouth Daily.      Zinc 50 MG tablet Take 1 tablet by mouth Daily.      Diclofenac Sodium (VOLTAREN) 1 % gel gel Apply 4 g topically to the appropriate area as directed 4 (Four) Times a Day As Needed (neck and back pain). 50 g 1    methylPREDNISolone (Medrol) 4 MG dose pack Take as directed on package instructions. (Patient not taking: Reported on 1/29/2024) 21 tablet 0     No current facility-administered medications for this visit.     History reviewed. No pertinent surgical history.   Health Maintenance Due   Topic Date Due    ZOSTER VACCINE (1 of 2) Never done    INFLUENZA VACCINE  Never done    COVID-19 Vaccine (6 - 2023-24 season) 09/01/2023      Immunization History   Administered Date(s) Administered    COVID-19 (MODERNA) 1st,2nd,3rd Dose Monovalent 02/04/2021, 03/04/2021, 11/03/2021, 06/09/2022    COVID-19 (MODERNA) BIVALENT 12+YRS 12/01/2022    Pneumococcal Conjugate 13-Valent (PCV13) 02/22/2022    Pneumococcal Conjugate 20-Valent (PCV20) 02/28/2023    Tdap 02/22/2021         Part of this note may be an electronic transcription/translation of spoken language to printed   text using the Dragon  Dictation System.      Raissa Patiño, APRN

## 2024-02-08 ENCOUNTER — TREATMENT (OUTPATIENT)
Dept: PHYSICAL THERAPY | Facility: CLINIC | Age: 79
End: 2024-02-08
Payer: MEDICARE

## 2024-02-08 DIAGNOSIS — M54.2 PAIN, NECK: Primary | ICD-10-CM

## 2024-02-08 DIAGNOSIS — R29.3 POSTURE IMBALANCE: ICD-10-CM

## 2024-02-08 DIAGNOSIS — M79.602 LEFT ARM PAIN: ICD-10-CM

## 2024-02-08 PROCEDURE — 97161 PT EVAL LOW COMPLEX 20 MIN: CPT | Performed by: PHYSICAL THERAPIST

## 2024-02-08 PROCEDURE — 97110 THERAPEUTIC EXERCISES: CPT | Performed by: PHYSICAL THERAPIST

## 2024-02-08 NOTE — PROGRESS NOTES
Physical Therapy Initial Evaluation and Plan of Care      Patient: Kian Wakefield   : 1945  Diagnosis/ICD-10 Code:  Pain, neck [M54.2]  Referring practitioner: TIM Schmidt  Date of Initial Visit: 2024  Today's Date: 2024  Patient seen for 1 sessions         Visit Diagnoses:    ICD-10-CM ICD-9-CM   1. Pain, neck  M54.2 723.1   2. Left arm pain  M79.602 729.5   3. Posture imbalance  R29.3 729.90         Subjective Evaluation    History of Present Illness  Mechanism of injury: Miles comes to [facility] with complaints of neck pain with radiation down on the L side of the neck, into the shoulder. When it gets really bad, it will run down the side of his trunk and around the back of the shoulder into the shoulder blade.     He has very little on the R side.     This has been going on for 10 years, progressively getting worse.      He thinks he overdoes it.  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     When it hurts bad, the pain is pretty steady.  It can last all day/night.  It hurts worse when he goes to bed, unable to sleep on the L side.     No headaches  No numbness/tingling       Pain  Current pain ratin  At worst pain ratin  Quality: dull ache  Relieving factors: medications (Tylenol)  Aggravating factors: sleeping    Social Support  Lives in: one-story house  Lives with: spouse    Hand dominance: right    Diagnostic Tests  X-ray: abnormal    Patient Goals  Patient goals for therapy: decreased pain, increased motion, increased strength, independence with ADLs/IADLs and return to sport/leisure activities           Objective           General Comments     Cervical/Thoracic Comments  Cervical AROM:  WFL all directions    He will get the pain run down the L arm, not tingling, and into the trunk/shoulder.     Special tests:  Compression: negative  Distraction: positive    Posture: forwards head, rounded shoulders, L  shoulder elevated slightly    L shoulder ROM flexion WFL    Tenderness: L upper trap (Trigger point noted), L levator, L lower trap/middle, L lats           Assessment & Plan       Assessment  Impairments: abnormal or restricted ROM, activity intolerance, impaired physical strength, lacks appropriate home exercise program and pain with function   Functional limitations: sleeping   Assessment details: Pt presents with limitations, noted below, that impede his ability to lift heavy weights, reading, working, and sleeping. The skills of a therapist will be required to safely and effectively implement the following treatment plan to restore maximal level of function.        Goals  Plan Goals: CERVICAL PROBLEMS    1. The patient has complaints of pain.   LTG 1: 12 weeks:  The patient will report 1/10 pain in order to more easily tolerate activities of daily living and improve sleep quality.    STATUS:  New   STG 1a: 4 weeks:  The patient will report 2/10 pain.    STATUS:  New  STG1b:  4 weeks:  The patient will be independent with home exercises.     STATUS:  New   TREATMENT: Manual therapy, therapeutic exercise, home exercise instruction, cervical traction, and modalities as needed to include: moist heat, electrical stimulation, and ultrasound.    2. Carrying, Moving, and Handling Objects Functional Limitation     LTG 2: 12 weeks:  The patient will demonstrate limitation by achieving a score of 5/50 on the Neck Disability Index.    STATUS:  New   STG 2a: 4 weeks:  The patient will demonstrate limitation by achieving a score of 10/50 on the Neck Disability Index.      STATUS:  New    3. The patient reports radicular symptoms in the left arm   LTG 3: 12 weeks:  The patient will report a decrease in radicular symptoms in the left upper extremity by 75%.    STATUS:  New   STG 3a: 4 weeks:  The patient will report a decrease in radicular symptoms in the left upper extremity by 50%.    STATUS:  New   TREATMENT: Manual  therapy, therapeutic exercise, home exercise instruction, cervical traction, and modalities as needed to include: moist heat, electrical stimulation, and ultrasound.            Plan  Therapy options: will be seen for skilled therapy services  Planned modality interventions: cryotherapy, dry needling, TENS and thermotherapy (hydrocollator packs)  Planned therapy interventions: flexibility, functional ROM exercises, home exercise program, manual therapy, neuromuscular re-education, postural training, soft tissue mobilization, spinal/joint mobilization, strengthening, stretching and therapeutic activities  Treatment plan discussed with: patient  Plan details: Create an HEP, update as needed.    Progress with cervical and scapular strength, trunk control/postural awareness, increased stamina, decreased tightness, improved ROM/flexibility, decrease trigger points, decrease radicular symptoms, education as needed.            History # of Personal Factors and/or Comorbidities: MODERATE (1-2)  Examination of Body System(s): # of elements: LOW (1-2)  Clinical Presentation: STABLE   Clinical Decision Making: LOW     Timed:  Manual Therapy:         mins  14243;  Therapeutic Exercise:    8     mins  13574;     Neuromuscular Bobby:        mins  40549;    Therapeutic Activity:          mins  95777;     Gait Training:           mins  11598;     Ultrasound:          mins  47360;    Canalith Repos           ___  mins  01285      Untimed:  Electrical Stimulation:         mins  09399 ( );  Mechanical Traction:         mins  06414;   Dry Needling:                    mins self pay  Low Eval:                      24     mins  21083;  Medium Eval:                     mins  28603;   High Eval:                          mins  33493       Timed Treatment:   8   mins   Total Treatment:     32   mins    PT SIGNATURE: Bruna Alonzo PT, TAYLOR  [place] License: [id number]  Electronically signed by Bruna Alonzo PT, 02/08/24, 6:57 AM EST      Initial  Certification    Certification Period: 2/8/2024 thru 5/7/2024  I certify that the therapy services are furnished while this patient is under my care.  The services outlined above are required by this patient, and will be reviewed every 90 days.     PHYSICIAN: Raissa Patiño APRN  NPI: [id number]            PHYSICIAN PRINT NAME: ______________________________________________      PHYSICIAN SIGNATURE: ______________________________________________         DATE:________________________________        Please sign and return via fax to [phone].  Thank you, [facility].

## 2024-02-12 ENCOUNTER — TREATMENT (OUTPATIENT)
Dept: PHYSICAL THERAPY | Facility: CLINIC | Age: 79
End: 2024-02-12
Payer: MEDICARE

## 2024-02-12 DIAGNOSIS — M79.602 LEFT ARM PAIN: ICD-10-CM

## 2024-02-12 DIAGNOSIS — R29.3 POSTURE IMBALANCE: ICD-10-CM

## 2024-02-12 DIAGNOSIS — M54.2 PAIN, NECK: Primary | ICD-10-CM

## 2024-02-12 PROCEDURE — 97012 MECHANICAL TRACTION THERAPY: CPT | Performed by: PHYSICAL THERAPIST

## 2024-02-12 PROCEDURE — 97110 THERAPEUTIC EXERCISES: CPT | Performed by: PHYSICAL THERAPIST

## 2024-02-12 PROCEDURE — 97530 THERAPEUTIC ACTIVITIES: CPT | Performed by: PHYSICAL THERAPIST

## 2024-02-16 ENCOUNTER — TREATMENT (OUTPATIENT)
Dept: PHYSICAL THERAPY | Facility: CLINIC | Age: 79
End: 2024-02-16
Payer: MEDICARE

## 2024-02-16 DIAGNOSIS — M54.2 PAIN, NECK: Primary | ICD-10-CM

## 2024-02-16 DIAGNOSIS — M79.602 LEFT ARM PAIN: ICD-10-CM

## 2024-02-16 DIAGNOSIS — R29.3 POSTURE IMBALANCE: ICD-10-CM

## 2024-02-16 PROCEDURE — 97110 THERAPEUTIC EXERCISES: CPT | Performed by: PHYSICAL THERAPIST

## 2024-02-16 PROCEDURE — 97140 MANUAL THERAPY 1/> REGIONS: CPT | Performed by: PHYSICAL THERAPIST

## 2024-02-20 ENCOUNTER — TREATMENT (OUTPATIENT)
Dept: PHYSICAL THERAPY | Facility: CLINIC | Age: 79
End: 2024-02-20
Payer: MEDICARE

## 2024-02-20 DIAGNOSIS — M54.2 PAIN, NECK: Primary | ICD-10-CM

## 2024-02-20 DIAGNOSIS — M79.602 LEFT ARM PAIN: ICD-10-CM

## 2024-02-20 NOTE — PROGRESS NOTES
Physical Therapy Daily Treatment Note      [address] [facility]  [address]  [place] [address]  [phone]  Patient: Kian Wakefield   : 1945  Referring practitioner: TIM Schmidt   Date of Initial Visit: Type: THERAPY  Noted: 2024  Today's Date: 2024  Patient seen for 4 sessions          Visit Diagnoses:    ICD-10-CM ICD-9-CM   1. Pain, neck  M54.2 723.1   2. Left arm pain  M79.602 729.5       Subjective   It's hurting in my left shoulder - inside my joint. Still having some N/T in LUE but less than what it was and neck is not hurting as much.  Still can't lift with the left arm.    Objective   See Exercise, Manual, and Modality Logs for complete treatment.   Capsular tightness L GHJ; noted intermittent popping L shdr with PROM    Assessment/Plan  Poor posturing with FHP.  Started back on gentle traction - manual, with no obvious discomfort.  Unable to determine effectiveness as not having radiating sxs today.  Chief complaint today was shoulder joint so added focus on that.    Continue per POC - progress postural exercises as tolerated and assess response to shoulder mobs    Timed:         Manual Therapy:    15     mins  02133;     Therapeutic Exercise:    18     mins  81811;     Neuromuscular Bobby:    0    mins  87800;    Therapeutic Activity:     0     mins  19815;     Gait Trainin     mins  89108;     Ultrasound:     0     mins  54572;    Ionto                               0    mins   29218  Self Care                       0     mins   48950      Un-Timed:  Electrical Stimulation:    0     mins  89777 ( );  Dry Needling     0     mins self-pay  Traction     0     mins 09241  Canalith Repos    0     mins 96927    Timed Treatment:   33   mins   Total Treatment:     33   mins    Jessi Ortega, PT  [place] License: [id number]

## 2024-02-26 ENCOUNTER — TREATMENT (OUTPATIENT)
Dept: PHYSICAL THERAPY | Facility: CLINIC | Age: 79
End: 2024-02-26
Payer: MEDICARE

## 2024-02-26 DIAGNOSIS — M79.602 LEFT ARM PAIN: ICD-10-CM

## 2024-02-26 DIAGNOSIS — R29.3 POSTURE IMBALANCE: ICD-10-CM

## 2024-02-26 DIAGNOSIS — M54.2 PAIN, NECK: Primary | ICD-10-CM

## 2024-02-26 PROCEDURE — 97110 THERAPEUTIC EXERCISES: CPT | Performed by: PHYSICAL THERAPIST

## 2024-02-26 PROCEDURE — 97530 THERAPEUTIC ACTIVITIES: CPT | Performed by: PHYSICAL THERAPIST

## 2024-02-26 NOTE — PROGRESS NOTES
Physical Therapy Daily Treatment Note      Patient: Kian Wakefield   : 1945  Referring practitioner: TIM Schmidt  Date of Initial Visit: Type: THERAPY  Noted: 2024  Today's Date: 2024  Patient seen for 5 sessions           Subjective Evaluation    History of Present Illness    Subjective comment: Pt had pain and discomfort in the joint region.       Objective   See Exercise, Manual, and Modality Logs for complete treatment.       Assessment & Plan       Assessment  Impairments: abnormal or restricted ROM, activity intolerance, impaired physical strength, lacks appropriate home exercise program and pain with function   Functional limitations: sleeping   Assessment details: Basic functional movements and strength training was performed at this tx visit.  Posturing cont to be slumped and forward. All exercises required vc and demonstration for safe and effective performance.    Goals  Plan Goals: CERVICAL PROBLEMS    1. The patient has complaints of pain.   LTG 1: 12 weeks:  The patient will report 1/10 pain in order to more easily tolerate activities of daily living and improve sleep quality.    STATUS:  Progressing   STG 1a: 4 weeks:  The patient will report 2/10 pain.    STATUS:  Progressing  STG1b:  4 weeks:  The patient will be independent with home exercises.     STATUS:  Progressing   TREATMENT: Manual therapy, therapeutic exercise, home exercise instruction, cervical traction, and modalities as needed to include: moist heat, electrical stimulation, and ultrasound.    2. Carrying, Moving, and Handling Objects Functional Limitation     LTG 2: 12 weeks:  The patient will demonstrate limitation by achieving a score of 5/50 on the Neck Disability Index.    STATUS:  Progressing   STG 2a: 4 weeks:  The patient will demonstrate limitation by achieving a score of 10/50 on the Neck Disability Index.      STATUS:  Progressing    3. The patient reports radicular symptoms in the left arm   LTG 3:  12 weeks:  The patient will report a decrease in radicular symptoms in the left upper extremity by 75%.    STATUS:  Progressing   STG 3a: 4 weeks:  The patient will report a decrease in radicular symptoms in the left upper extremity by 50%.    STATUS:  Progressing   TREATMENT: Manual therapy, therapeutic exercise, home exercise instruction, cervical traction, and modalities as needed to include: moist heat, electrical stimulation, and ultrasound.            Plan  Therapy options: will be seen for skilled therapy services  Planned modality interventions: cryotherapy, dry needling, TENS and thermotherapy (hydrocollator packs)  Planned therapy interventions: flexibility, functional ROM exercises, home exercise program, manual therapy, neuromuscular re-education, postural training, soft tissue mobilization, spinal/joint mobilization, strengthening, stretching and therapeutic activities  Treatment plan discussed with: patient  Plan details: Traction trial again if needed, depending on patient preference. Continue to work toward better posturing and strength for both cervical and scapular regions.           Visit Diagnoses:    ICD-10-CM ICD-9-CM   1. Pain, neck  M54.2 723.1   2. Left arm pain  M79.602 729.5   3. Posture imbalance  R29.3 729.90       Progress per Plan of Care    Timed:    Therapeutic Exercise:    16     mins  16523;  Manual Therapy:         mins  74314;     Neuromuscular Bobby:       mins  83787;    Therapeutic Activity:     9     mins  24324;     Gait Training:           mins  00473;     Ultrasound:          mins  37059;      Untimed:  Electrical Stimulation:         mins  06417 ( );  Mechanical Traction:         mins  89813;     Timed Treatment:   25   mins   Total Treatment:     30   mins      Sheri Flor PTA  Physical Therapist Assistant

## 2024-03-01 ENCOUNTER — LAB (OUTPATIENT)
Dept: LAB | Facility: HOSPITAL | Age: 79
End: 2024-03-01
Payer: MEDICARE

## 2024-03-01 ENCOUNTER — OFFICE VISIT (OUTPATIENT)
Dept: FAMILY MEDICINE CLINIC | Age: 79
End: 2024-03-01
Payer: MEDICARE

## 2024-03-01 VITALS
WEIGHT: 179.4 LBS | HEIGHT: 65 IN | HEART RATE: 57 BPM | SYSTOLIC BLOOD PRESSURE: 182 MMHG | OXYGEN SATURATION: 98 % | BODY MASS INDEX: 29.89 KG/M2 | TEMPERATURE: 98 F | DIASTOLIC BLOOD PRESSURE: 83 MMHG

## 2024-03-01 DIAGNOSIS — Z12.5 SCREENING FOR MALIGNANT NEOPLASM OF PROSTATE: ICD-10-CM

## 2024-03-01 DIAGNOSIS — I21.9 MYOCARDIAL INFARCTION, UNSPECIFIED MI TYPE, UNSPECIFIED ARTERY: ICD-10-CM

## 2024-03-01 DIAGNOSIS — I10 PRIMARY HYPERTENSION: ICD-10-CM

## 2024-03-01 DIAGNOSIS — Z13.6 SCREENING FOR CARDIOVASCULAR CONDITION: ICD-10-CM

## 2024-03-01 DIAGNOSIS — Z00.00 MEDICARE ANNUAL WELLNESS VISIT, SUBSEQUENT: Primary | ICD-10-CM

## 2024-03-01 LAB
ALBUMIN SERPL-MCNC: 4.5 G/DL (ref 3.5–5.2)
ALBUMIN/GLOB SERPL: 1.7 G/DL
ALP SERPL-CCNC: 68 U/L (ref 39–117)
ALT SERPL W P-5'-P-CCNC: 27 U/L (ref 1–41)
ANION GAP SERPL CALCULATED.3IONS-SCNC: 10 MMOL/L (ref 5–15)
AST SERPL-CCNC: 23 U/L (ref 1–40)
BILIRUB SERPL-MCNC: 0.4 MG/DL (ref 0–1.2)
BUN SERPL-MCNC: 10 MG/DL (ref 8–23)
BUN/CREAT SERPL: 10.6 (ref 7–25)
CALCIUM SPEC-SCNC: 9 MG/DL (ref 8.6–10.5)
CHLORIDE SERPL-SCNC: 103 MMOL/L (ref 98–107)
CHOLEST SERPL-MCNC: 166 MG/DL (ref 0–200)
CO2 SERPL-SCNC: 26 MMOL/L (ref 22–29)
CREAT SERPL-MCNC: 0.94 MG/DL (ref 0.76–1.27)
DEPRECATED RDW RBC AUTO: 45.5 FL (ref 37–54)
EGFRCR SERPLBLD CKD-EPI 2021: 84 ML/MIN/1.73
ERYTHROCYTE [DISTWIDTH] IN BLOOD BY AUTOMATED COUNT: 13.3 % (ref 12.3–15.4)
GLOBULIN UR ELPH-MCNC: 2.7 GM/DL
GLUCOSE SERPL-MCNC: 90 MG/DL (ref 65–99)
HCT VFR BLD AUTO: 46.9 % (ref 37.5–51)
HDLC SERPL-MCNC: 67 MG/DL (ref 40–60)
HGB BLD-MCNC: 15.3 G/DL (ref 13–17.7)
LDLC SERPL CALC-MCNC: 80 MG/DL (ref 0–100)
LDLC/HDLC SERPL: 1.15 {RATIO}
MCH RBC QN AUTO: 29.6 PG (ref 26.6–33)
MCHC RBC AUTO-ENTMCNC: 32.6 G/DL (ref 31.5–35.7)
MCV RBC AUTO: 90.7 FL (ref 79–97)
PLATELET # BLD AUTO: 318 10*3/MM3 (ref 140–450)
PMV BLD AUTO: 8.3 FL (ref 6–12)
POTASSIUM SERPL-SCNC: 4.5 MMOL/L (ref 3.5–5.2)
PROT SERPL-MCNC: 7.2 G/DL (ref 6–8.5)
PSA SERPL-MCNC: 10 NG/ML (ref 0–4)
RBC # BLD AUTO: 5.17 10*6/MM3 (ref 4.14–5.8)
SODIUM SERPL-SCNC: 139 MMOL/L (ref 136–145)
TRIGL SERPL-MCNC: 109 MG/DL (ref 0–150)
VLDLC SERPL-MCNC: 19 MG/DL (ref 5–40)
WBC NRBC COR # BLD AUTO: 7.19 10*3/MM3 (ref 3.4–10.8)

## 2024-03-01 PROCEDURE — 85027 COMPLETE CBC AUTOMATED: CPT

## 2024-03-01 PROCEDURE — 80061 LIPID PANEL: CPT

## 2024-03-01 PROCEDURE — 80053 COMPREHEN METABOLIC PANEL: CPT

## 2024-03-01 PROCEDURE — 36415 COLL VENOUS BLD VENIPUNCTURE: CPT

## 2024-03-01 PROCEDURE — G0103 PSA SCREENING: HCPCS

## 2024-03-01 RX ORDER — AMLODIPINE BESYLATE 5 MG/1
5 TABLET ORAL DAILY
COMMUNITY
Start: 2024-02-23 | End: 2024-05-23

## 2024-03-01 NOTE — PROGRESS NOTES
The ABCs of the Annual Wellness Visit  Subsequent Medicare Wellness Visit    Subjective      Kian Wakefield is a 78 y.o. male who presents for a Subsequent Medicare Wellness Visit.    The following portions of the patient's history were reviewed and   updated as appropriate: allergies, current medications, past family history, past medical history, past social history, past surgical history, and problem list.    Compared to one year ago, the patient feels his physical   health is the same.    Compared to one year ago, the patient feels his mental   health is the same.    Recent Hospitalizations:  He was not admitted to the hospital during the last year.     Miles presents today for follow up on Hypertension.    Current medication / treatment includes Metoprolol and amlodipine (just started by cardiologist last week but has not started taking this yet).    Reported as home BP checks: It is well controlled when checked.  Cardiac symptoms none.  The ASCVD Risk score (Heidi DK, et al., 2019) failed to calculate for the following reasons:    The patient has a prior MI or stroke diagnosis    Current Medical Providers:  Patient Care Team:  Raissa Patiño APRN as PCP - General (Nurse Practitioner)  Oral Poe MD as Consulting Physician (Cardiology)  Jemma Herndon OD (Optometry)    Outpatient Medications Prior to Visit   Medication Sig Dispense Refill    amLODIPine (NORVASC) 5 MG tablet Take 1 tablet by mouth Daily.      aspirin 81 MG EC tablet Take 1 tablet by mouth Daily.      cyanocobalamin (VITAMIN B-12) 1000 MCG tablet Take 1 tablet by mouth Daily.      cyclobenzaprine (FLEXERIL) 10 MG tablet Take 1 tablet by mouth At Night As Needed for Muscle Spasms. 20 tablet 0    Diclofenac Sodium (VOLTAREN) 1 % gel gel Apply 4 g topically to the appropriate area as directed 4 (Four) Times a Day As Needed (neck and back pain). 50 g 1    ezetimibe (ZETIA) 10 MG tablet Take 1 tablet by mouth Daily.      metoprolol  "tartrate (LOPRESSOR) 25 MG tablet 2 (Two) Times a Day. 1/2 tablet daily      rosuvastatin (CRESTOR) 40 MG tablet Take 1 tablet by mouth Daily.      Zinc 50 MG tablet Take 1 tablet by mouth Daily.      methylPREDNISolone (Medrol) 4 MG dose pack Take as directed on package instructions. (Patient not taking: Reported on 3/1/2024) 21 tablet 0     No facility-administered medications prior to visit.       No opioid medication identified on active medication list. I have reviewed chart for other potential  high risk medication/s and harmful drug interactions in the elderly.        Aspirin is on active medication list. Aspirin use is indicated based on review of current medical condition/s. Pros and cons of this therapy have been discussed today. Benefits of this medication outweigh potential harm.  Patient has been encouraged to continue taking this medication.  .      Patient Active Problem List   Diagnosis    Atherosclerotic heart disease of native coronary artery without angina pectoris    Arteriosclerosis of coronary artery    Hyperlipidemia    Hypercholesterolemia    Hypertension    Myocardial infarction    Seasonal allergic rhinitis    Stented coronary artery     Advance Care Planning   Advance Care Planning     Advance Directive is not on file.  ACP discussion was held with the patient during this visit. Patient does not have an advance directive, declines further assistance.     Objective    Vitals:    03/01/24 0900 03/01/24 1013   BP: 180/68 (!) 182/83   BP Location: Left arm Left arm   Patient Position: Sitting Sitting   Cuff Size: Adult Adult   Pulse: 56 57   Temp: 98 °F (36.7 °C)    TempSrc: Oral    SpO2: 100% 98%   Weight: 81.4 kg (179 lb 6.4 oz)    Height: 164.5 cm (64.76\")      Estimated body mass index is 30.08 kg/m² as calculated from the following:    Height as of this encounter: 164.5 cm (64.76\").    Weight as of this encounter: 81.4 kg (179 lb 6.4 oz).           Does the patient have evidence of " cognitive impairment?   No            HEALTH RISK ASSESSMENT    Smoking Status:  Social History     Tobacco Use   Smoking Status Never   Smokeless Tobacco Never     Alcohol Consumption:  Social History     Substance and Sexual Activity   Alcohol Use Yes    Comment: jonesey nightly   - 2 drinks per night     Fall Risk Screen:    ILEANA Fall Risk Assessment was completed, and patient is at LOW risk for falls.Assessment completed on:3/1/2024    Depression Screening:      3/1/2024     9:08 AM   PHQ-2/PHQ-9 Depression Screening   Little Interest or Pleasure in Doing Things 0-->not at all   Feeling Down, Depressed or Hopeless 0-->not at all   PHQ-9: Brief Depression Severity Measure Score 0       Health Habits and Functional and Cognitive Screening:      3/1/2024     9:08 AM   Functional & Cognitive Status   Do you have difficulty preparing food and eating? No   Do you have difficulty bathing yourself, getting dressed or grooming yourself? No   Do you have difficulty using the toilet? No   Do you have difficulty moving around from place to place? No   Do you have trouble with steps or getting out of a bed or a chair? No   Current Diet Well Balanced Diet   Dental Exam Up to date   Eye Exam Not up to date   Exercise (times per week) 3 times per week   Current Exercises Include Walking;Yard Work   Do you need help using the phone?  No   Are you deaf or do you have serious difficulty hearing?  No   Do you need help to go to places out of walking distance? No   Do you need help shopping? No   Do you need help preparing meals?  No   Do you need help with housework?  No   Do you need help with laundry? No   Do you need help taking your medications? No   Do you need help managing money? No   Do you ever drive or ride in a car without wearing a seat belt? No   Have you felt unusual stress, anger or loneliness in the last month? No   Who do you live with? Spouse   If you need help, do you have trouble finding someone available to  you? No   Have you been bothered in the last four weeks by sexual problems? No   Do you have difficulty concentrating, remembering or making decisions? No       Age-appropriate Screening Schedule:  Refer to the list below for future screening recommendations based on patient's age, sex and/or medical conditions. Orders for these recommended tests are listed in the plan section. The patient has been provided with a written plan.    Health Maintenance   Topic Date Due    LIPID PANEL  02/28/2024    RSV Vaccine - Adults (1 - 1-dose 60+ series) 03/01/2025 (Originally 10/15/2007)    ZOSTER VACCINE (1 of 2) 03/01/2025 (Originally 10/15/1997)    INFLUENZA VACCINE  03/30/2025 (Originally 8/1/2023)    BMI FOLLOWUP  01/29/2025    ANNUAL WELLNESS VISIT  03/01/2025    TDAP/TD VACCINES (2 - Td or Tdap) 02/22/2031    HEPATITIS C SCREENING  Completed    COVID-19 Vaccine  Completed    Pneumococcal Vaccine 65+  Completed    COLORECTAL CANCER SCREENING  Discontinued                  CMS Preventative Services Quick Reference  Risk Factors Identified During Encounter:    Hearing Problem:  declines referral at this time- 'maybe next year'  Immunizations Discussed/Encouraged: Influenza, Shingrix, and RSV (Respiratory Syncytial Virus)  Vision Screening Recommended      The above risks/problems have been discussed with the patient.  Pertinent information has been shared with the patient in the After Visit Summary.    Diagnoses and all orders for this visit:    1. Medicare annual wellness visit, subsequent (Primary)    2. Myocardial infarction, unspecified MI type, unspecified artery  -     CBC No Differential; Future    3. Primary hypertension  Comments:  advised to start the amlodipine as prescribed by cardiology and follow up as advised for BP monitoring and management    4. Screening for cardiovascular condition  -     Lipid panel; Future  -     Comprehensive metabolic panel; Future    5. Screening for malignant neoplasm of prostate  -      PSA SCREENING; Future    Other orders  -     COVID-19 F23 (Pfizer) 12yrs+ (COMIRNATY)        Follow Up:   Next Medicare Wellness visit to be scheduled in 1 year.      An After Visit Summary and PPPS were made available to the patient.

## 2024-03-03 DIAGNOSIS — R97.20 ELEVATED PSA, BETWEEN 10 AND LESS THAN 20 NG/ML: Primary | ICD-10-CM

## 2024-03-04 ENCOUNTER — TREATMENT (OUTPATIENT)
Dept: PHYSICAL THERAPY | Facility: CLINIC | Age: 79
End: 2024-03-04
Payer: MEDICARE

## 2024-03-04 DIAGNOSIS — M79.602 LEFT ARM PAIN: ICD-10-CM

## 2024-03-04 DIAGNOSIS — R29.3 POSTURE IMBALANCE: ICD-10-CM

## 2024-03-04 DIAGNOSIS — M54.2 PAIN, NECK: Primary | ICD-10-CM

## 2024-03-04 PROCEDURE — 97530 THERAPEUTIC ACTIVITIES: CPT | Performed by: PHYSICAL THERAPIST

## 2024-03-04 PROCEDURE — 97110 THERAPEUTIC EXERCISES: CPT | Performed by: PHYSICAL THERAPIST

## 2024-03-04 NOTE — PROGRESS NOTES
Re-Assessment / Re-Certification        Patient: Kian Wakefield   : 1945  Diagnosis/ICD-10 Code:  Pain, neck [M54.2]  Referring practitioner: TIM Schmidt  Date of Initial Visit: Type: THERAPY  Noted: 2024  Today's Date: 3/4/2024  Patient seen for 6 sessions      Subjective:     Visit Diagnoses:    ICD-10-CM ICD-9-CM   1. Pain, neck  M54.2 723.1   2. Left arm pain  M79.602 729.5   3. Posture imbalance  R29.3 729.90       Clinical Progress: improved  Home Program Compliance: Yes  Treatment has included: therapeutic exercise, neuromuscular re-education, manual therapy, and therapeutic activity      Subjective Evaluation    History of Present Illness  Mechanism of injury: Patient's pain today is very, very little.    Overall, patient feels that PT has been helpful for him. He notes an improvement of 90% since starting therapy.  He is no longer having difficulty with anything.  He thinks he can be discharged with PT at this time.            Objective           General Comments     Cervical/Thoracic Comments  Cervical AROM:  WFL all directions    He will get the pain run down the L arm if he sleeps on it    Posture: forwards head, rounded shoulders    L shoulder ROM flexion WFL    Tenderness: none noted       Assessment & Plan       Assessment  Impairments: abnormal or restricted ROM, activity intolerance, impaired physical strength, lacks appropriate home exercise program and pain with function   Functional limitations: sleeping   Assessment details: Miles has done well with PT. He is no longer having much pain at all in the neck or down the LUE.  He is able to do all HEP without issues.  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  He will be discharged from PT services at this time.     Goals  Plan Goals: CERVICAL PROBLEMS    1. The patient has complaints of pain.   LTG 1: 12 weeks:  The patient will report 1/10 pain in order to more easily tolerate activities of daily  living and improve sleep quality.    STATUS:  MET   STG 1a: 4 weeks:  The patient will report 2/10 pain.    STATUS:  MET  STG1b:  4 weeks:  The patient will be independent with home exercises.     STATUS:  MET   TREATMENT: Manual therapy, therapeutic exercise, home exercise instruction, cervical traction, and modalities as needed to include: moist heat, electrical stimulation, and ultrasound.    2. Carrying, Moving, and Handling Objects Functional Limitation     LTG 2: 12 weeks:  The patient will demonstrate limitation by achieving a score of 5/50 on the Neck Disability Index.    STATUS: MET   STG 2a: 4 weeks:  The patient will demonstrate limitation by achieving a score of 10/50 on the Neck Disability Index.      STATUS: MET    3. The patient reports radicular symptoms in the left arm   LTG 3: 12 weeks:  The patient will report a decrease in radicular symptoms in the left upper extremity by 75%.    STATUS:  MET   STG 3a: 4 weeks:  The patient will report a decrease in radicular symptoms in the left upper extremity by 50%.    STATUS:  MET   TREATMENT: Manual therapy, therapeutic exercise, home exercise instruction, cervical traction, and modalities as needed to include: moist heat, electrical stimulation, and ultrasound.            Plan  Treatment plan discussed with: patient  Plan details: Discharge PT at this time, continue with HEP      Progress toward previous goals: All Met    Timed:  Manual Therapy:         mins  07436;  Therapeutic Exercise:    13     mins  14639;     Neuromuscular Bobby:        mins  74543;    Therapeutic Activity:     10     mins  98837;     Gait Training:           mins  36535;     Ultrasound:          mins  35407;    Canalith Repos           ___  mins  88087      Untimed:  Electrical Stimulation:         mins  42872 ( );  Mechanical Traction:         mins  47375;   Dry Needling:                     mins self pay  Re-Eval:                           mins  95374         Timed  Treatment:   23   mins   Total Treatment:     25   mins          PT SIGNATURE: Bruna BRENDON Alonzo, DPT  [place] License: [id number]       Certification Period: 3/4/2024 thru 6/1/2024  I certify that the therapy services are furnished while this patient is under my care.  The services outlined above are required by this patient, and will be reviewed every 90 days.     PHYSICIAN: Raissa Patiño APRN  NPI: [id number]      PHYSICIAN PRINT NAME: ______________________________________________      PHYSICIAN SIGNATURE: ______________________________________________         DATE:________________________________        Please sign and return via fax to [phone].  Thank you, [facility].

## 2024-03-06 ENCOUNTER — OFFICE VISIT (OUTPATIENT)
Dept: UROLOGY | Facility: CLINIC | Age: 79
End: 2024-03-06
Payer: MEDICARE

## 2024-03-06 VITALS
SYSTOLIC BLOOD PRESSURE: 149 MMHG | DIASTOLIC BLOOD PRESSURE: 72 MMHG | HEIGHT: 66 IN | WEIGHT: 179.4 LBS | HEART RATE: 56 BPM | BODY MASS INDEX: 28.83 KG/M2

## 2024-03-06 DIAGNOSIS — R97.20 ELEVATED PROSTATE SPECIFIC ANTIGEN (PSA): Primary | ICD-10-CM

## 2024-03-06 NOTE — PROGRESS NOTES
"Chief Complaint: Elevated PSA (Pt is here today as a new pt. Pt was referred here by his PCP, Raissa Patiño after his PSA was elevated with his yearly labs.)    Subjective         History of Present Illness  Kian Wakefield is a 78 y.o. male presents to [facility] to be seen for elevated PSA.    Patient saw PCP on 3/1/24 he asked to have \"all labs checked\" and his PSA was performed.     His psa returned 10.0     His last PSA in  was 4.7    He is on no prostate medication.     Nocturia x 2    Good stream     He denies straining or hesitancy but does endorse urgency.     Denies frequency.     Denies splitting or spraying stream.    No family HX of  malignancies.    Objective     Past Medical History:   Diagnosis Date    Myocardial infarction     2 stents placement       History reviewed. No pertinent surgical history.      Current Outpatient Medications:     amLODIPine (NORVASC) 5 MG tablet, Take 1 tablet by mouth Daily., Disp: , Rfl:     aspirin 81 MG EC tablet, Take 1 tablet by mouth Daily., Disp: , Rfl:     cyanocobalamin (VITAMIN B-12) 1000 MCG tablet, Take 1 tablet by mouth Daily., Disp: , Rfl:     ezetimibe (ZETIA) 10 MG tablet, Take 1 tablet by mouth Daily., Disp: , Rfl:     metoprolol tartrate (LOPRESSOR) 25 MG tablet, 2 (Two) Times a Day. 1/2 tablet daily, Disp: , Rfl:     rosuvastatin (CRESTOR) 40 MG tablet, Take 1 tablet by mouth Daily., Disp: , Rfl:     Zinc 50 MG tablet, Take 1 tablet by mouth Daily., Disp: , Rfl:     Allergies   Allergen Reactions    Levofloxacin Swelling        Family History   Problem Relation Age of Onset    Heart attack Father          at 52    Heart attack Brother         x 3  - total of 11 brother         Social History     Socioeconomic History    Marital status:     Number of children: 2   Tobacco Use    Smoking status: Never     Passive exposure: Never    Smokeless tobacco: Never   Vaping Use    Vaping status: Never Used " "  Substance and Sexual Activity    Alcohol use: Yes     Comment: kyle nightly   - 2 drinks per night    Drug use: Never    Sexual activity: Not Currently       Vital Signs:   /72 (BP Location: Right arm, Patient Position: Sitting, Cuff Size: Adult)   Pulse 56   Ht 167.6 cm (66\")   Wt 81.4 kg (179 lb 6.4 oz)   BMI 28.96 kg/m²      Physical Exam     Result Review :   The following data was reviewed by: TIM Dawn on 03/06/2024:  Results for orders placed or performed in visit on 03/01/24   PSA SCREENING    Specimen: Blood   Result Value Ref Range    PSA 10.000 (H) 0.000 - 4.000 ng/mL   CBC No Differential    Specimen: Blood   Result Value Ref Range    WBC 7.19 3.40 - 10.80 10*3/mm3    RBC 5.17 4.14 - 5.80 10*6/mm3    Hemoglobin 15.3 13.0 - 17.7 g/dL    Hematocrit 46.9 37.5 - 51.0 %    MCV 90.7 79.0 - 97.0 fL    MCH 29.6 26.6 - 33.0 pg    MCHC 32.6 31.5 - 35.7 g/dL    RDW 13.3 12.3 - 15.4 %    RDW-SD 45.5 37.0 - 54.0 fl    MPV 8.3 6.0 - 12.0 fL    Platelets 318 140 - 450 10*3/mm3   Lipid panel    Specimen: Blood   Result Value Ref Range    Total Cholesterol 166 0 - 200 mg/dL    Triglycerides 109 0 - 150 mg/dL    HDL Cholesterol 67 (H) 40 - 60 mg/dL    LDL Cholesterol  80 0 - 100 mg/dL    VLDL Cholesterol 19 5 - 40 mg/dL    LDL/HDL Ratio 1.15    Comprehensive metabolic panel    Specimen: Blood   Result Value Ref Range    Glucose 90 65 - 99 mg/dL    BUN 10 8 - 23 mg/dL    Creatinine 0.94 0.76 - 1.27 mg/dL    Sodium 139 136 - 145 mmol/L    Potassium 4.5 3.5 - 5.2 mmol/L    Chloride 103 98 - 107 mmol/L    CO2 26.0 22.0 - 29.0 mmol/L    Calcium 9.0 8.6 - 10.5 mg/dL    Total Protein 7.2 6.0 - 8.5 g/dL    Albumin 4.5 3.5 - 5.2 g/dL    ALT (SGPT) 27 1 - 41 U/L    AST (SGOT) 23 1 - 40 U/L    Alkaline Phosphatase 68 39 - 117 U/L    Total Bilirubin 0.4 0.0 - 1.2 mg/dL    Globulin 2.7 gm/dL    A/G Ratio 1.7 g/dL    BUN/Creatinine Ratio 10.6 7.0 - 25.0    Anion Gap 10.0 5.0 - 15.0 mmol/L    eGFR 84.0 >60.0 " mL/min/1.73      PSA          3/1/2024    10:41   PSA   PSA 10.000          Procedures        Assessment and Plan    Diagnoses and all orders for this visit:    1. Elevated prostate specific antigen (PSA) (Primary)  -     Cancel: MRI Abdomen With & Without Contrast; Future  -     MRI Prostate With & Without Contrast; Future        Given patient's PSA level we will proceed with MRI of the prostate to delineate underlying etiology of elevated PSA and potentially provide mapping for fusion biopsy.    We will follow-up with him with the results determine further plan of care.        I spent 15 minutes caring for Kian on this date of service. This time includes time spent by me in the following activities:reviewing tests, obtaining and/or reviewing a separately obtained history, performing a medically appropriate examination and/or evaluation , counseling and educating the patient/family/caregiver, ordering medications, tests, or procedures, and documenting information in the medical record  Follow Up   Return for after MRI .  Patient was given instructions and counseling regarding his condition or for health maintenance advice. Please see specific information pulled into the AVS if appropriate.         This document has been electronically signed by TIM Dawn  March 6, 2024 11:38 EST

## 2024-04-14 ENCOUNTER — HOSPITAL ENCOUNTER (OUTPATIENT)
Facility: HOSPITAL | Age: 79
Discharge: HOME | End: 2024-04-14
Admitting: NURSE PRACTITIONER
Payer: MEDICARE

## 2024-04-14 DIAGNOSIS — R97.20 ELEVATED PROSTATE SPECIFIC ANTIGEN (PSA): ICD-10-CM

## 2024-04-14 PROCEDURE — 72197 MRI PELVIS W/O & W/DYE: CPT

## 2024-04-14 PROCEDURE — 0 GADOBENATE DIMEGLUMINE 529 MG/ML SOLUTION: Performed by: NURSE PRACTITIONER

## 2024-04-14 PROCEDURE — A9577 INJ MULTIHANCE: HCPCS | Performed by: NURSE PRACTITIONER

## 2024-04-14 RX ADMIN — GADOBENATE DIMEGLUMINE 20 ML: 529 INJECTION, SOLUTION INTRAVENOUS at 16:07

## 2024-04-19 ENCOUNTER — PREP FOR SURGERY (OUTPATIENT)
Dept: OTHER | Facility: HOSPITAL | Age: 79
End: 2024-04-19
Payer: MEDICARE

## 2024-04-19 DIAGNOSIS — R97.20 ELEVATED PROSTATE SPECIFIC ANTIGEN (PSA): Primary | ICD-10-CM

## 2024-04-19 RX ORDER — SODIUM CHLORIDE 0.9 % (FLUSH) 0.9 %
3 SYRINGE (ML) INJECTION EVERY 12 HOURS SCHEDULED
OUTPATIENT
Start: 2024-04-19

## 2024-04-19 RX ORDER — SODIUM CHLORIDE 9 MG/ML
40 INJECTION, SOLUTION INTRAVENOUS AS NEEDED
OUTPATIENT
Start: 2024-04-19

## 2024-04-19 RX ORDER — SODIUM CHLORIDE 9 MG/ML
100 INJECTION, SOLUTION INTRAVENOUS CONTINUOUS
OUTPATIENT
Start: 2024-04-19

## 2024-04-19 RX ORDER — SULFAMETHOXAZOLE AND TRIMETHOPRIM 400; 80 MG/1; MG/1
TABLET ORAL
Qty: 6 TABLET | Refills: 0 | Status: SHIPPED | OUTPATIENT
Start: 2024-04-19

## 2024-04-19 RX ORDER — SODIUM CHLORIDE 0.9 % (FLUSH) 0.9 %
10 SYRINGE (ML) INJECTION AS NEEDED
OUTPATIENT
Start: 2024-04-19

## 2024-05-06 NOTE — PRE-PROCEDURE INSTRUCTIONS
PATIENT INSTRUCTED TO BE:    - NOTHING TO EAT AFTER MIDNIGHT OR CHEW, EXCEPT CAN HAVE CLEAR LIQUIDS 2 HOURS PRIOR TO SURGERY ARRIVAL TIME     - TO HOLD ALL VITAMINS, SUPPLEMENTS, NSAIDS FOR ONE WEEK PRIOR TO THEIR SURGICAL PROCEDURE    - DO NOT TAKE _Zinc, Vitamin B12  7 DAYS PRIOR TO PROCEDURE PER ANESTHESIA RECOMMENDATIONS/INSTRUCTIONS     - INSTRUCTED PT TO USE SURGICAL SOAP 1 TIME THE NIGHT PRIOR TO SURGERY OR THE AM OF SURGERY.   USE SOAP FROM NECK TO TOES AVOID THEIR FACE, HAIR, AND PRIVATE PARTS. INSTRUCTED NO LOTIONS, JEWELRY, PIERCINGS, OR DEODORANT DAY OF SURGERY    - -INSTRUCTED TO TAKE THE FOLLOWING MEDICATIONS THE DAY OF SURGERY: Metoprolol, Amlodipine, Zetia     - DO NOT BRING ANY MEDICATIONS WITH YOU TO THE HOSPITAL THE DAY OF SURGERY, EXCEPT IF USE INHALERS. BRING INHALERS DAY OF SURGERY       -MAKE SURE YOU HAVE A RIDE HOME OR SOMEONE TO STAY WITH YOU THE DAY OF THE PROCEDURE AFTER YOU GO HOME    - FOLLOW ANY OTHER INSTRUCTIONS GIVEN TO YOU BY YOUR SURGEON'S OFFICE.     - PREADMISSION TESTING NURSE Alva BLAKE RN AT  [phone]  IF HAVE ANY QUESTIONS     PATIENT PROVIDED THE NUMBER FOR PREOP SURGICAL DEPT IF HAD QUESTIONS AFTER HOURS PRIOR TO SURGERY ([phone])  INFORMED PT IF NO ANSWER, LEAVE A MESSAGE AND SOMEONE WILL RETURN THEIR CALL       PATIENT VERBALIZED UNDERSTANDING

## 2024-05-07 ENCOUNTER — TELEPHONE (OUTPATIENT)
Dept: UROLOGY | Facility: CLINIC | Age: 79
End: 2024-05-07
Payer: MEDICARE

## 2024-05-09 ENCOUNTER — HOSPITAL ENCOUNTER (OUTPATIENT)
Facility: HOSPITAL | Age: 79
Setting detail: HOSPITAL OUTPATIENT SURGERY
Discharge: HOME | End: 2024-05-09
Attending: UROLOGY | Admitting: UROLOGY
Payer: MEDICARE

## 2024-05-09 ENCOUNTER — ANESTHESIA (OUTPATIENT)
Dept: PERIOP | Facility: HOSPITAL | Age: 79
End: 2024-05-09
Payer: MEDICARE

## 2024-05-09 ENCOUNTER — ANESTHESIA EVENT (OUTPATIENT)
Dept: PERIOP | Facility: HOSPITAL | Age: 79
End: 2024-05-09
Payer: MEDICARE

## 2024-05-09 VITALS
WEIGHT: 175.49 LBS | SYSTOLIC BLOOD PRESSURE: 127 MMHG | TEMPERATURE: 97 F | OXYGEN SATURATION: 100 % | HEIGHT: 66 IN | HEART RATE: 57 BPM | DIASTOLIC BLOOD PRESSURE: 62 MMHG | RESPIRATION RATE: 12 BRPM | BODY MASS INDEX: 28.2 KG/M2

## 2024-05-09 DIAGNOSIS — R97.20 ELEVATED PROSTATE SPECIFIC ANTIGEN (PSA): ICD-10-CM

## 2024-05-09 PROCEDURE — 88305 TISSUE EXAM BY PATHOLOGIST: CPT | Performed by: UROLOGY

## 2024-05-09 PROCEDURE — 55700 PR PROSTATE NEEDLE BIOPSY ANY APPROACH: CPT | Performed by: UROLOGY

## 2024-05-09 PROCEDURE — 76942 ECHO GUIDE FOR BIOPSY: CPT | Performed by: UROLOGY

## 2024-05-09 PROCEDURE — 25010000002 MIDAZOLAM PER 1MG: Performed by: ANESTHESIOLOGY

## 2024-05-09 PROCEDURE — 25010000002 PROPOFOL 10 MG/ML EMULSION: Performed by: NURSE ANESTHETIST, CERTIFIED REGISTERED

## 2024-05-09 PROCEDURE — 25810000003 LACTATED RINGERS PER 1000 ML: Performed by: ANESTHESIOLOGY

## 2024-05-09 PROCEDURE — 25010000002 CEFTRIAXONE PER 250 MG: Performed by: NURSE PRACTITIONER

## 2024-05-09 RX ORDER — ONDANSETRON 2 MG/ML
4 INJECTION INTRAMUSCULAR; INTRAVENOUS ONCE AS NEEDED
Status: DISCONTINUED | OUTPATIENT
Start: 2024-05-09 | End: 2024-05-09 | Stop reason: HOSPADM

## 2024-05-09 RX ORDER — SODIUM CHLORIDE, SODIUM LACTATE, POTASSIUM CHLORIDE, CALCIUM CHLORIDE 600; 310; 30; 20 MG/100ML; MG/100ML; MG/100ML; MG/100ML
9 INJECTION, SOLUTION INTRAVENOUS CONTINUOUS PRN
Status: DISCONTINUED | OUTPATIENT
Start: 2024-05-09 | End: 2024-05-09 | Stop reason: HOSPADM

## 2024-05-09 RX ORDER — SODIUM CHLORIDE 0.9 % (FLUSH) 0.9 %
3 SYRINGE (ML) INJECTION EVERY 12 HOURS SCHEDULED
Status: DISCONTINUED | OUTPATIENT
Start: 2024-05-09 | End: 2024-05-09 | Stop reason: HOSPADM

## 2024-05-09 RX ORDER — MIDAZOLAM HYDROCHLORIDE 2 MG/2ML
0.5 INJECTION, SOLUTION INTRAMUSCULAR; INTRAVENOUS ONCE
Status: COMPLETED | OUTPATIENT
Start: 2024-05-09 | End: 2024-05-09

## 2024-05-09 RX ORDER — PROMETHAZINE HYDROCHLORIDE 12.5 MG/1
25 TABLET ORAL ONCE AS NEEDED
Status: DISCONTINUED | OUTPATIENT
Start: 2024-05-09 | End: 2024-05-09 | Stop reason: HOSPADM

## 2024-05-09 RX ORDER — ONDANSETRON 4 MG/1
4 TABLET, ORALLY DISINTEGRATING ORAL ONCE AS NEEDED
Status: DISCONTINUED | OUTPATIENT
Start: 2024-05-09 | End: 2024-05-09 | Stop reason: HOSPADM

## 2024-05-09 RX ORDER — ACETAMINOPHEN 500 MG
1000 TABLET ORAL ONCE
Status: COMPLETED | OUTPATIENT
Start: 2024-05-09 | End: 2024-05-09

## 2024-05-09 RX ORDER — PROMETHAZINE HYDROCHLORIDE 25 MG/1
25 SUPPOSITORY RECTAL ONCE AS NEEDED
Status: DISCONTINUED | OUTPATIENT
Start: 2024-05-09 | End: 2024-05-09 | Stop reason: HOSPADM

## 2024-05-09 RX ORDER — LIDOCAINE HYDROCHLORIDE 20 MG/ML
INJECTION, SOLUTION EPIDURAL; INFILTRATION; INTRACAUDAL; PERINEURAL AS NEEDED
Status: DISCONTINUED | OUTPATIENT
Start: 2024-05-09 | End: 2024-05-09 | Stop reason: SURG

## 2024-05-09 RX ORDER — SODIUM CHLORIDE 9 MG/ML
40 INJECTION, SOLUTION INTRAVENOUS AS NEEDED
Status: DISCONTINUED | OUTPATIENT
Start: 2024-05-09 | End: 2024-05-09 | Stop reason: HOSPADM

## 2024-05-09 RX ORDER — SODIUM CHLORIDE 0.9 % (FLUSH) 0.9 %
10 SYRINGE (ML) INJECTION AS NEEDED
Status: DISCONTINUED | OUTPATIENT
Start: 2024-05-09 | End: 2024-05-09 | Stop reason: HOSPADM

## 2024-05-09 RX ORDER — SODIUM CHLORIDE 9 MG/ML
100 INJECTION, SOLUTION INTRAVENOUS CONTINUOUS
Status: DISCONTINUED | OUTPATIENT
Start: 2024-05-09 | End: 2024-05-09 | Stop reason: HOSPADM

## 2024-05-09 RX ORDER — ACETAMINOPHEN 325 MG/1
650 TABLET ORAL ONCE
Status: DISCONTINUED | OUTPATIENT
Start: 2024-05-09 | End: 2024-05-09 | Stop reason: HOSPADM

## 2024-05-09 RX ORDER — OXYCODONE HYDROCHLORIDE 5 MG/1
5 TABLET ORAL
Status: DISCONTINUED | OUTPATIENT
Start: 2024-05-09 | End: 2024-05-09 | Stop reason: HOSPADM

## 2024-05-09 RX ORDER — PROMETHAZINE HYDROCHLORIDE 12.5 MG/1
12.5 TABLET ORAL ONCE AS NEEDED
Status: DISCONTINUED | OUTPATIENT
Start: 2024-05-09 | End: 2024-05-09 | Stop reason: HOSPADM

## 2024-05-09 RX ORDER — PROPOFOL 10 MG/ML
VIAL (ML) INTRAVENOUS AS NEEDED
Status: DISCONTINUED | OUTPATIENT
Start: 2024-05-09 | End: 2024-05-09 | Stop reason: SURG

## 2024-05-09 RX ADMIN — PROPOFOL 250 MCG/KG/MIN: 10 INJECTION, EMULSION INTRAVENOUS at 12:37

## 2024-05-09 RX ADMIN — ACETAMINOPHEN 1000 MG: 500 TABLET ORAL at 11:35

## 2024-05-09 RX ADMIN — MIDAZOLAM HYDROCHLORIDE 0.5 MG: 1 INJECTION, SOLUTION INTRAMUSCULAR; INTRAVENOUS at 12:07

## 2024-05-09 RX ADMIN — LIDOCAINE HYDROCHLORIDE 100 MG: 20 INJECTION, SOLUTION INTRAVENOUS at 12:37

## 2024-05-09 RX ADMIN — SODIUM CHLORIDE, POTASSIUM CHLORIDE, SODIUM LACTATE AND CALCIUM CHLORIDE 9 ML/HR: 600; 310; 30; 20 INJECTION, SOLUTION INTRAVENOUS at 11:35

## 2024-05-09 RX ADMIN — PROPOFOL 80 MG: 10 INJECTION, EMULSION INTRAVENOUS at 12:37

## 2024-05-09 RX ADMIN — CEFTRIAXONE SODIUM 1000 MG: 1 INJECTION, POWDER, FOR SOLUTION INTRAMUSCULAR; INTRAVENOUS at 12:36

## 2024-05-09 NOTE — OP NOTE
PROSTATE ULTRASOUND BIOPSY MRI FUSION WITH URONAV  Procedure Report    Patient Name:  Kian Wakefield  YOB: 1945    Date of Surgery:  5/9/2024      Pre-op Diagnosis:   Elevated prostate specific antigen (PSA) [R97.20]       Postop diagnosis:    Same    Procedure/CPT® Codes:      Procedure(s):    MRI fusion transrectal ultrasound-guided prostate biopsy      Staff:  Surgeon(s):  Levi Avila MD         Anesthesia: Monitored Anesthesia Care    Estimated Blood Loss: 0 mL    Implants:    Nothing was implanted during the procedure    Specimen:          Specimens       ID Source Type Tests Collected By Collected At Frozen?    A Prostate Tissue TISSUE PATHOLOGY EXAM   Levi Avila MD 5/9/24 1241     Description: LEFT BASE X2    This specimen was not marked as sent.    B Prostate Tissue TISSUE PATHOLOGY EXAM   Levi Avila MD 5/9/24 1241     Description: LEFT MID X2    This specimen was not marked as sent.    C Prostate Tissue TISSUE PATHOLOGY EXAM   Levi Avila MD 5/9/24 1241     Description: LEFT APEX X2    This specimen was not marked as sent.    D Prostate Tissue TISSUE PATHOLOGY EXAM   Levi Avila MD 5/9/24 1241     Description: RIGHT BASE X2    This specimen was not marked as sent.    E Prostate Tissue TISSUE PATHOLOGY EXAM   Levi Avila MD 5/9/24 1241     Description: RIGHT MID X2    This specimen was not marked as sent.    F Prostate Tissue TISSUE PATHOLOGY EXAM   Levi Avila MD 5/9/24 1241     Description: RIGHT APEX X2    This specimen was not marked as sent.    G Prostate Tissue TISSUE PATHOLOGY EXAM   Levi Avila MD 5/9/24 1241     Description: REGION OF INTEREST X2    This specimen was not marked as sent.                Findings:     none    Complications: none    Description of Procedure:     After informed consent patient was taken to the operating room.  Patient was laid supine and placed under monitored anesthesia care by the anesthesia  team.  At this point a multidisciplinary timeout was undertaken documenting the correct patient site and procedure.  Patient was laid on his left side down in fetal position.  Ultrasound probe was placed into the rectum and the prostate was visualized without issue.  A sweep was done from base to apex to link the real-time ultrasound to the MRI data.  The region of interest was identified and biopsies were taken from the region of interest.  I then did 12 systematic biopsies starting on the left side.  2 from the base, 2 from the mid and 2 from the apex.  These were done medially and laterally.  I then did the right side in the same fashion.  Patient tolerated the procedure well.  There were no intraoperative complications.  Minimal bleeding from the rectum.  Patient was awakened and taken to the postanesthesia care unit without problem.          Levi Avila MD     Date: 5/9/2024  Time: 12:56 EDT

## 2024-05-09 NOTE — H&P
[facility]   UROLOGY HISTORY AND PHYSICAL    Patient Name: Kian Wakefield  : 1945  MRN: [id number]  Primary Care Physician:  Raissa Patiño APRN  Date of admission: 2024    Subjective   Subjective     Chief Complaint:     Elevated PSA      HPI:    Kian Wakefield is a 78 y.o. male elevated PSA    No change in H&P    Review of Systems     10 systems reviewed and are negative other than what is listed in HPI    Personal History     Past Medical History:   Diagnosis Date    Cancer     prostate    Hyperlipidemia     Hypertension     Malaria 1969    Myocardial infarction 2003    2 stents placement       Past Surgical History:   Procedure Laterality Date    CARDIAC CATHETERIZATION         Family History: family history includes Heart attack in his brother and father. Otherwise pertinent FHx was reviewed and not pertinent to current issue.    Social History:  reports that he has never smoked. He has never been exposed to tobacco smoke. He has never used smokeless tobacco. He reports current alcohol use. He reports that he does not use drugs.    Home Medications:  Zinc, amLODIPine, aspirin, cyanocobalamin, ezetimibe, metoprolol tartrate, rosuvastatin, sildenafil, and sulfamethoxazole-trimethoprim      Allergies:  Allergies   Allergen Reactions    Levofloxacin Swelling       Objective   Objective     Vitals:   Temp:  [97.8 °F (36.6 °C)] 97.8 °F (36.6 °C)  Heart Rate:  [60] 60  Resp:  [20] 20  BP: (130)/(71) 130/71  Physical Exam    Constitutional: Awake, alert    Respiratory: Clear to auscultation bilaterally, nonlabored respirations    Cardiovascular: RRR, no murmurs, rubs, or gallops, palpable pedal pulses bilaterally   Gastrointestinal: Positive bowel sounds, soft, nontender, nondistended       Result Review    Result Review:  I have personally reviewed the results from the time of this admission to 2024 10:34 EDT and agree with these findings:  []  Laboratory  []  Microbiology  []  Radiology  []   EKG/Telemetry   []  Cardiology/Vascular   []  Pathology  []  Old records  []  Other:    Assessment & Plan   Assessment / Plan     Brief Patient Summary:  Kian Wakefield is a 78 y.o. male     Active Hospital Problems:  Active Hospital Problems    Diagnosis     **Elevated prostate specific antigen (PSA)          MRI fusion transrectal ultrasound-guided prostate biopsy.  Risks and benefits were discussed including bleeding, infection and damage to the urinary system.  We also discussed the risk of anesthesia up to and including death.  Patient voiced understanding and would like to proceed.    Electronically signed by Levi Avila MD, 05/09/24, 10:34 AM EDT.

## 2024-05-09 NOTE — DISCHARGE INSTRUCTIONS
Discharge Instructions Prostate Biopsy             For your surgery you had:  General anesthesia (you may have a sore throat for the first 24 hours)        You may experience dizziness, drowsiness, or lightheadedness for several hours following surgery.  Do not stay alone today or tonight.  Limit your activity for 24 hours.  You should not drive, operate machinery, drink alcohol, or sign legally binding documents for 24 hours or while you are taking pain medication.  Resume your diet slowly.  Follow any special dietary instructions you may have been given by your doctor.    NOTIFY YOUR DOCTOR IF YOU EXPERIENCE ANY OF THE FOLLOWING:  Temperature greater than 101 degrees Fahrenheit  Shaking Chills  Redness or excessive drainage from incision  Nausea, vomiting and/or pain that is not controlled by prescribed medications  Increase in bleeding or bleeding that is excessive  Unable to urinate in 6 hours after surgery  If unable to reach your doctor, please go to the closest Emergency Room.   Drink 4-6 glasses of water a day for 3-4 days  You may see blood in your urine for up to a week, blood in your stool for 3-4 days, and blood in semen for up to a month  If you are passing large clots, call your doctor  Avoid lifting or straining for 48 hours  It is normal to experience burning during urination for 48 hours after biopsy  Call your doctor if pain, burning, urgency, or frequency of urination persist beyond 48 hours  Medications per physician as indicated on discharge medication information sheet    SPECIAL INSTRUCTIONS:                      Last dose of pain medication given at:     .

## 2024-05-13 ENCOUNTER — TELEPHONE (OUTPATIENT)
Dept: UROLOGY | Facility: CLINIC | Age: 79
End: 2024-05-13
Payer: MEDICARE

## 2024-05-13 LAB
CYTO UR: NORMAL
LAB AP CASE REPORT: NORMAL
LAB AP CLINICAL INFORMATION: NORMAL
PATH REPORT.FINAL DX SPEC: NORMAL
PATH REPORT.GROSS SPEC: NORMAL

## 2024-05-22 PROBLEM — C61 PROSTATE CANCER: Status: ACTIVE | Noted: 2024-05-22

## 2024-05-22 NOTE — PROGRESS NOTES
Chief Complaint: Urologic complaint    Subjective         History of Present Illness  Kian Wakefield is a 78 y.o. male         Prostatic adenocarcinoma clinical T1c      No trouble since the biopsy.    Good stream, no frequency, nocturia x 2.  No prostate meds      No family HX of  malignancies.      MRI, 2 coronary stents aspirin 81.    Followed by cardiology  No pulmonary issues,  No DM    Can still get erections,    worried about this, uses tadalafil 2.5 mg as needed     No previous abdominal surgeries.    Brother is in his 80s, father  in his 50s of MI.     PVR         027      Prostate CA    2024 MRI bx  Left apex - 4+3, 2/2, 18%  Right apex - 3+3, 1/2, 3%  Region of interest-3+3, 1/2, 5%    2024 MRI prostate -37 g - PSAd 0.27  PI-RADS 4 - 9 mm lesion right anterior peripheral zone, prostatic apex.    3/24   10.0  3/22     4.7      Objective     Past Medical History:   Diagnosis Date    Cancer     prostate    Hyperlipidemia     Hypertension     Malaria 1969    Myocardial infarction     2 stents placement       Past Surgical History:   Procedure Laterality Date    CARDIAC CATHETERIZATION      COLONOSCOPY      PROSTATE BIOPSY N/A 2024    Procedure: PROSTATE ULTRASOUND BIOPSY MRI FUSION WITH URONAV;  Surgeon: Levi Avila MD;  Location: [facility];  Service: Urology;  Laterality: N/A;         Current Outpatient Medications:     amLODIPine (NORVASC) 5 MG tablet, Take 1 tablet by mouth Daily., Disp: , Rfl:     aspirin 81 MG EC tablet, Take 1 tablet by mouth Daily. Last dose 5/3/24 per pt, Disp: , Rfl:     cyanocobalamin (VITAMIN B-12) 1000 MCG tablet, Take 1 tablet by mouth Daily., Disp: , Rfl:     ezetimibe (ZETIA) 10 MG tablet, Take 1 tablet by mouth Daily., Disp: , Rfl:     metoprolol tartrate (LOPRESSOR) 25 MG tablet, 2 (Two) Times a Day. 1/2 tablet daily, Disp: , Rfl:     rosuvastatin (CRESTOR) 40 MG tablet, Take 1 tablet by mouth Every Night., Disp: , Rfl:      sulfamethoxazole-trimethoprim (Bactrim) 400-80 MG tablet, 1 tab p.o. twice daily starting the day before prostate biopsy (start on 5/8/2024), Disp: 6 tablet, Rfl: 0    Zinc 50 MG tablet, Take 1 tablet by mouth Daily., Disp: , Rfl:         Bladder Scan interpretation 05/24/2024    Estimation of residual urine via BVI 3000 Verathon Bladder Scan  MA/nurse performing: Wing MONTES DE OCA  Residual Urine: 27 ml  Indication: Prostate cancer   Position: Supine  Examination: Incremental scanning of the suprapubic area using 2.0 MHz transducer using copious amounts of acoustic gel.   Findings: An anechoic area was demonstrated which represented the bladder, with measurement of residual urine as noted. I inspected this myself. In that the residual urine was stable or insignificant, refer to plan for treatment and plan necessary at this time.                   Assessment and Plan    Diagnoses and all orders for this visit:    1. Prostate cancer (Primary)        Today in clinic the patient was counseled on the risk and benefits of laparoscopic robotic prostatectomy.  All risk and benefits were discussed including but not limited to the risk of bleeding, infection, damage to adjacent structures, anesthetic complications and all other complications up to and including death.         We also discussed the alternatives of laparoscopic robotic prostatectomy including the risk and benefits of each.  This included but was not limited to brachy therapy, external beam radiation therapy, open prostatectomy, active surveillance and also watchful waiting.         We also discussed today in clinic the side effects of surgery including erectile dysfunction and urinary incontinence.  The patient understands that his erections will not be as good as they are now after surgery.  We also discussed he may get no erections after surgery.  We also discussed that the patient will leak urine after surgery and this gets better over time usually taking a year to  see a new baseline continence. The treatments of these were also discussed.  Patient understands these risks and acknowledges understanding.         We also discussed radiation therapy and encouraged the patient to seek an opinion a radiation oncologist.  We discussed the different radiation modalities including IMRTand brachytherapy.  We discussed a 10 year outcomes from radiation and surgery appears similar.  We discussed the risk of radiation including erectile dysfunction, radiation cystitis, proctitis and secondary malignancies.         We also discussed alternative therapies including HIFU and cryotherapy.  We discussed these or not currently NCCN guidelines and are not considered standard of care currently.    Patient at this time is undecided      Prostate cancer handouts given, he will follow-up in a couple weeks to discuss further

## 2024-05-24 ENCOUNTER — OFFICE VISIT (OUTPATIENT)
Dept: UROLOGY | Facility: CLINIC | Age: 79
End: 2024-05-24
Payer: MEDICARE

## 2024-05-24 VITALS — BODY MASS INDEX: 28.45 KG/M2 | HEIGHT: 66 IN | WEIGHT: 177 LBS | RESPIRATION RATE: 16 BRPM

## 2024-05-24 DIAGNOSIS — C61 PROSTATE CANCER: Primary | ICD-10-CM

## 2024-06-17 ENCOUNTER — PREP FOR SURGERY (OUTPATIENT)
Dept: OTHER | Facility: HOSPITAL | Age: 79
End: 2024-06-17
Payer: MEDICARE

## 2024-06-17 ENCOUNTER — OFFICE VISIT (OUTPATIENT)
Dept: UROLOGY | Facility: CLINIC | Age: 79
End: 2024-06-17
Payer: MEDICARE

## 2024-06-17 VITALS — BODY MASS INDEX: 28.45 KG/M2 | WEIGHT: 177 LBS | HEIGHT: 66 IN

## 2024-06-17 DIAGNOSIS — C61 PROSTATE CA: Primary | ICD-10-CM

## 2024-06-17 DIAGNOSIS — C61 PROSTATE CANCER: Primary | ICD-10-CM

## 2024-06-17 DIAGNOSIS — R30.0 DYSURIA: ICD-10-CM

## 2024-06-17 PROCEDURE — 1160F RVW MEDS BY RX/DR IN RCRD: CPT | Performed by: UROLOGY

## 2024-06-17 PROCEDURE — 1159F MED LIST DOCD IN RCRD: CPT | Performed by: UROLOGY

## 2024-06-17 PROCEDURE — 99214 OFFICE O/P EST MOD 30 MIN: CPT | Performed by: UROLOGY

## 2024-06-17 RX ORDER — SODIUM CHLORIDE 9 MG/ML
40 INJECTION, SOLUTION INTRAVENOUS AS NEEDED
OUTPATIENT
Start: 2024-06-17

## 2024-06-17 RX ORDER — SODIUM CHLORIDE 9 MG/ML
100 INJECTION, SOLUTION INTRAVENOUS CONTINUOUS
OUTPATIENT
Start: 2024-06-17

## 2024-06-17 RX ORDER — SODIUM CHLORIDE 0.9 % (FLUSH) 0.9 %
3 SYRINGE (ML) INJECTION EVERY 12 HOURS SCHEDULED
OUTPATIENT
Start: 2024-06-17

## 2024-06-17 RX ORDER — SODIUM CHLORIDE 0.9 % (FLUSH) 0.9 %
10 SYRINGE (ML) INJECTION AS NEEDED
OUTPATIENT
Start: 2024-06-17

## 2024-07-01 ENCOUNTER — TELEPHONE (OUTPATIENT)
Dept: UROLOGY | Facility: CLINIC | Age: 79
End: 2024-07-01
Payer: MEDICARE

## 2024-07-01 DIAGNOSIS — C61 PROSTATE CANCER: Primary | ICD-10-CM

## 2024-07-01 NOTE — TELEPHONE ENCOUNTER
Pt and spouse walked into the office to let us know they would like to consult with RAD ONC rather than proceeding with prostatectomy. We have cancelled the surgery. They would like to see RAD ONC at [facility] rather than [facility]. I have placed that internal referral. We have scheduled him for a follow up with [place] on 07/22/24 at 1115. We will get him preapproved for a 22.5 Lupron. Pt verbalized understanding of all instruction via teach back

## 2024-07-12 ENCOUNTER — CONSULT (OUTPATIENT)
Dept: RADIATION ONCOLOGY | Facility: HOSPITAL | Age: 79
End: 2024-07-12
Payer: MEDICARE

## 2024-07-12 VITALS
TEMPERATURE: 98 F | DIASTOLIC BLOOD PRESSURE: 62 MMHG | HEART RATE: 52 BPM | BODY MASS INDEX: 28.64 KG/M2 | WEIGHT: 177.47 LBS | RESPIRATION RATE: 18 BRPM | OXYGEN SATURATION: 94 % | SYSTOLIC BLOOD PRESSURE: 135 MMHG

## 2024-07-12 DIAGNOSIS — C61 PROSTATE CANCER: Primary | ICD-10-CM

## 2024-07-12 PROCEDURE — G0463 HOSPITAL OUTPT CLINIC VISIT: HCPCS | Performed by: RADIOLOGY

## 2024-07-12 NOTE — LETTER
July 12, 2024     Levi Avila MD  [address]  [place] [address]    Patient: Kian Wakefield   YOB: 1945   Date of Visit: 7/12/2024     Dear Levi Avila MD:       Thank you for referring Kian Wakefield to me for evaluation. Below are the relevant portions of my assessment and plan of care.    If you have questions, please do not hesitate to call me. I look forward to following Kian along with you.         Sincerely,        Donovan Huston MD        CC: TIM Schmidt William A, MD  07/12/24 1441  Sign when Signing Visit       New Patient Office Visit      Encounter Date: 07/12/2024   Patient Name: Kian Wakefield  YOB: 1945   Medical Record Number: [id number]   Primary Diagnosis: Prostate cancer [C61]         Chief Complaint:    Chief Complaint   Patient presents with   • Consult   • Prostate Cancer       History of Present Illness: Kian Wakefield is a 78-year-old gentleman with intermediate risk prostate cancer who is seen in consultation regarding radiotherapy as a component of definitive management.  Mr. Wakefield underwent serial PSA testing revealing elevated PSA.  PSA in March 2022 was 4.7 ng/mL, increasing to 10 ng/mL in 3/2024.  This prompted MRI guided biopsy of the prostate with MRI on 4/18/2024 revealing a 9 mm PI-RADS 4 lesion in the right anterior peripheral zone at the prostatic apex.  Pathology from biopsy performed on 5/9/2024 revealed [place] 4+3 equal 7 in 2 of 2 cores from the left apex as well as [place] 3+3 equal 6 from the right apex and region of interest involving 1 of 2 cores, respectively.  He reports feeling well overall with essentially no complaints.  He does have some urinary urgency but denies dysuria or hematuria.  He reports normal bowel movements and underwent colonoscopy approximately 2 years ago.    Subjective   AUS S: 2  SH IM: 12      Review of Systems: Review of Systems   Constitutional:  Negative for activity  change, fatigue and unexpected weight change.   HENT:  Negative for sore throat, tinnitus and trouble swallowing.    Eyes:  Negative for visual disturbance.   Respiratory:  Negative for cough and shortness of breath.    Cardiovascular:  Negative for chest pain, palpitations and leg swelling.        Hx MI 2 stents placed     Gastrointestinal:  Negative for anal bleeding, blood in stool, constipation, diarrhea, nausea and rectal pain.        Colonoscopy      Genitourinary:  Positive for difficulty urinating (occasional weak stream) and urgency (occasional). Negative for dysuria, frequency and hematuria.        Noc x 1-2   Musculoskeletal:  Positive for back pain (occasional ongoing). Negative for arthralgias.   Skin:  Negative for color change and rash.   Neurological:  Negative for dizziness, weakness and headaches.   Psychiatric/Behavioral:  Negative for agitation, self-injury, sleep disturbance and suicidal ideas.        Past Medical History:   Past Medical History:   Diagnosis Date   • Cancer     prostate   • Hyperlipidemia    • Hypertension    • Malaria    • Myocardial infarction     2 stents placement       Past Surgical History:   Past Surgical History:   Procedure Laterality Date   • CARDIAC CATHETERIZATION     • COLONOSCOPY     • PROSTATE BIOPSY N/A 2024    Procedure: PROSTATE ULTRASOUND BIOPSY MRI FUSION WITH URONAV;  Surgeon: Levi Avila MD;  Location: [facility] MAIN OR;  Service: Urology;  Laterality: N/A;       Family History:   Family History   Problem Relation Age of Onset   • Heart attack Father          at 52   • Cancer Brother         lung   • Heart attack Brother         x 3  - total of 11 brother     • Malig Hyperthermia Neg Hx        Social History:   Social History     Socioeconomic History   • Marital status:    • Number of children: 2   Tobacco Use   • Smoking status: Never     Passive exposure: Never   • Smokeless tobacco: Never   Vaping Use   • Vaping  status: Never Used   Substance and Sexual Activity   • Alcohol use: Yes     Comment: kyle nightly   - 2 drinks per night   • Drug use: Never   • Sexual activity: Not Currently       Medications:     Current Outpatient Medications:   •  aspirin 81 MG EC tablet, Take 1 tablet by mouth Daily. Last dose 5/3/24 per pt, Disp: , Rfl:   •  cyanocobalamin (VITAMIN B-12) 1000 MCG tablet, Take 1 tablet by mouth Daily., Disp: , Rfl:   •  ezetimibe (ZETIA) 10 MG tablet, Take 1 tablet by mouth Daily., Disp: , Rfl:   •  metoprolol tartrate (LOPRESSOR) 25 MG tablet, 2 (Two) Times a Day. 1/2 tablet daily, Disp: , Rfl:   •  rosuvastatin (CRESTOR) 40 MG tablet, Take 1 tablet by mouth Every Night., Disp: , Rfl:   •  Zinc 50 MG tablet, Take 1 tablet by mouth Daily., Disp: , Rfl:     Allergies:   Allergies   Allergen Reactions   • Levofloxacin Swelling       Pain: (on a scale of 0-10)   Pain Score    07/12/24 0817   PainSc: 0-No pain       Kian Wakefield reports a pain score of 0.  Given his pain assessment as noted, treatment options were discussed and the following options were decided upon as a follow-up plan to address the patient's pain: continuation of current treatment plan for pain.     Advanced Care Plan: N   Quality of Life: 100 - Full Activity    Objective     Physical Exam:   Vital Signs:   Vitals:    07/12/24 0817   BP: 135/62   Pulse: 52   Resp: 18   Temp: 98 °F (36.7 °C)   TempSrc: Temporal   SpO2: 94%   Weight: 80.5 kg (177 lb 7.5 oz)   PainSc: 0-No pain     Body mass index is 28.64 kg/m².     Physical Exam  Constitutional:       General: He is not in acute distress.     Appearance: He is not toxic-appearing.   HENT:      Head: Normocephalic and atraumatic.   Pulmonary:      Effort: Pulmonary effort is normal. No respiratory distress.   Skin:     General: Skin is warm and dry.   Neurological:      General: No focal deficit present.      Mental Status: He is alert and oriented to person, place, and time.      Cranial  Nerves: No cranial nerve deficit.      Gait: Gait normal.   Psychiatric:         Mood and Affect: Mood normal.         Behavior: Behavior normal.         Judgment: Judgment normal.         Results:   Radiographs: I personally reviewed the MRI of the prostate performed on 4/14/2024.  The pertinent findings are as above in HPI.      Pathology: I personally reviewed the pathology report from the procedure performed on 5/9/2024.  The pertinent findings are as above in HPI.    Labs:   WBC   Date Value Ref Range Status   03/01/2024 7.19 3.40 - 10.80 10*3/mm3 Final     Hemoglobin   Date Value Ref Range Status   03/01/2024 15.3 13.0 - 17.7 g/dL Final     Hematocrit   Date Value Ref Range Status   03/01/2024 46.9 37.5 - 51.0 % Final     Platelets   Date Value Ref Range Status   03/01/2024 318 140 - 450 10*3/mm3 Final      PSA   Date Value Ref Range Status   03/01/2024 10.000 (H) 0.000 - 4.000 ng/mL Final   05/02/2022 4.790 (H) 0.000 - 4.000 ng/mL Final        Assessment / Plan      Assessment/Plan:   Kian Wakefield is a 78-year-old gentleman with cT1c cN0c M0 adenocarcinoma the prostate [place] 4+3 = 7, PSA 10 ng/mL.  He has no clinical or radiographic evidence of regional or distant metastatic disease.  ECOG 0    I discussed the clinical, radiographic and pathologic findings to date with Mr. Wakefield.  I explained the role of radiotherapy in the definitive management of unfavorable intermediate risk prostate cancer, outlining the rationale for this approach.  I explained that the benefit of of radiotherapy must be weighed against the potential acute and chronic toxicities.  I additionally explained that 1 must consider the likelihood of experiencing this benefit given competing comorbidities.  I explained this in plain terms.  Mr. Wakefield is most interested in undergoing external beam radiotherapy as definitive management.  He will be scheduled for hydrogel dissection and fiducial marker placement before undergoing CT simulation  and MRI fusion for treatment planning purposes.  He will return to Dr. Avila for a discussion regarding concurrent androgen deprivation therapy.      Donovan Huston MD  Radiation Oncology  [facility]    This document has been signed by Donovan Huston MD on July 12, 2024 14:40 EDT

## 2024-07-12 NOTE — PROGRESS NOTES
New Patient Office Visit      Encounter Date: 07/12/2024   Patient Name: Kian Wakefield  YOB: 1945   Medical Record Number: [id number]   Primary Diagnosis: Prostate cancer [C61]         Chief Complaint:    Chief Complaint   Patient presents with    Consult    Prostate Cancer       History of Present Illness: Kian Wakefield is a 78-year-old gentleman with intermediate risk prostate cancer who is seen in consultation regarding radiotherapy as a component of definitive management.  Mr. Wakefield underwent serial PSA testing revealing elevated PSA.  PSA in March 2022 was 4.7 ng/mL, increasing to 10 ng/mL in 3/2024.  This prompted MRI guided biopsy of the prostate with MRI on 4/18/2024 revealing a 9 mm PI-RADS 4 lesion in the right anterior peripheral zone at the prostatic apex.  Pathology from biopsy performed on 5/9/2024 revealed [place] 4+3 equal 7 in 2 of 2 cores from the left apex as well as [place] 3+3 equal 6 from the right apex and region of interest involving 1 of 2 cores, respectively.  He reports feeling well overall with essentially no complaints.  He does have some urinary urgency but denies dysuria or hematuria.  He reports normal bowel movements and underwent colonoscopy approximately 2 years ago.    Subjective   AUS S: 2  SH IM: 12      Review of Systems: Review of Systems   Constitutional:  Negative for activity change, fatigue and unexpected weight change.   HENT:  Negative for sore throat, tinnitus and trouble swallowing.    Eyes:  Negative for visual disturbance.   Respiratory:  Negative for cough and shortness of breath.    Cardiovascular:  Negative for chest pain, palpitations and leg swelling.        Hx MI 2 stents placed     Gastrointestinal:  Negative for anal bleeding, blood in stool, constipation, diarrhea, nausea and rectal pain.        Colonoscopy 2022     Genitourinary:  Positive for difficulty urinating (occasional weak stream) and urgency (occasional). Negative for  dysuria, frequency and hematuria.        Noc x 1-2   Musculoskeletal:  Positive for back pain (occasional ongoing). Negative for arthralgias.   Skin:  Negative for color change and rash.   Neurological:  Negative for dizziness, weakness and headaches.   Psychiatric/Behavioral:  Negative for agitation, self-injury, sleep disturbance and suicidal ideas.        Past Medical History:   Past Medical History:   Diagnosis Date    Cancer     prostate    Hyperlipidemia     Hypertension     Malaria 1969    Myocardial infarction 2003    2 stents placement       Past Surgical History:   Past Surgical History:   Procedure Laterality Date    CARDIAC CATHETERIZATION      COLONOSCOPY      PROSTATE BIOPSY N/A 2024    Procedure: PROSTATE ULTRASOUND BIOPSY MRI FUSION WITH URONAV;  Surgeon: Levi Avila MD;  Location: [facility] MAIN OR;  Service: Urology;  Laterality: N/A;       Family History:   Family History   Problem Relation Age of Onset    Heart attack Father          at 52    Cancer Brother         lung    Heart attack Brother         x 3  - total of 11 brother      Malig Hyperthermia Neg Hx        Social History:   Social History     Socioeconomic History    Marital status:     Number of children: 2   Tobacco Use    Smoking status: Never     Passive exposure: Never    Smokeless tobacco: Never   Vaping Use    Vaping status: Never Used   Substance and Sexual Activity    Alcohol use: Yes     Comment: whiskey nightly   - 2 drinks per night    Drug use: Never    Sexual activity: Not Currently       Medications:     Current Outpatient Medications:     aspirin 81 MG EC tablet, Take 1 tablet by mouth Daily. Last dose 5/3/24 per pt, Disp: , Rfl:     cyanocobalamin (VITAMIN B-12) 1000 MCG tablet, Take 1 tablet by mouth Daily., Disp: , Rfl:     ezetimibe (ZETIA) 10 MG tablet, Take 1 tablet by mouth Daily., Disp: , Rfl:     metoprolol tartrate (LOPRESSOR) 25 MG tablet, 2 (Two) Times a Day. 1/2 tablet daily, Disp: ,  Rfl:     rosuvastatin (CRESTOR) 40 MG tablet, Take 1 tablet by mouth Every Night., Disp: , Rfl:     Zinc 50 MG tablet, Take 1 tablet by mouth Daily., Disp: , Rfl:     Allergies:   Allergies   Allergen Reactions    Levofloxacin Swelling       Pain: (on a scale of 0-10)   Pain Score    07/12/24 0817   PainSc: 0-No pain       Kian Wakefield reports a pain score of 0.  Given his pain assessment as noted, treatment options were discussed and the following options were decided upon as a follow-up plan to address the patient's pain: continuation of current treatment plan for pain.     Advanced Care Plan: N   Quality of Life: 100 - Full Activity    Objective     Physical Exam:   Vital Signs:   Vitals:    07/12/24 0817   BP: 135/62   Pulse: 52   Resp: 18   Temp: 98 °F (36.7 °C)   TempSrc: Temporal   SpO2: 94%   Weight: 80.5 kg (177 lb 7.5 oz)   PainSc: 0-No pain     Body mass index is 28.64 kg/m².     Physical Exam  Constitutional:       General: He is not in acute distress.     Appearance: He is not toxic-appearing.   HENT:      Head: Normocephalic and atraumatic.   Pulmonary:      Effort: Pulmonary effort is normal. No respiratory distress.   Skin:     General: Skin is warm and dry.   Neurological:      General: No focal deficit present.      Mental Status: He is alert and oriented to person, place, and time.      Cranial Nerves: No cranial nerve deficit.      Gait: Gait normal.   Psychiatric:         Mood and Affect: Mood normal.         Behavior: Behavior normal.         Judgment: Judgment normal.         Results:   Radiographs: I personally reviewed the MRI of the prostate performed on 4/14/2024.  The pertinent findings are as above in HPI.      Pathology: I personally reviewed the pathology report from the procedure performed on 5/9/2024.  The pertinent findings are as above in HPI.    Labs:   WBC   Date Value Ref Range Status   03/01/2024 7.19 3.40 - 10.80 10*3/mm3 Final     Hemoglobin   Date Value Ref Range Status    03/01/2024 15.3 13.0 - 17.7 g/dL Final     Hematocrit   Date Value Ref Range Status   03/01/2024 46.9 37.5 - 51.0 % Final     Platelets   Date Value Ref Range Status   03/01/2024 318 140 - 450 10*3/mm3 Final      PSA   Date Value Ref Range Status   03/01/2024 10.000 (H) 0.000 - 4.000 ng/mL Final   05/02/2022 4.790 (H) 0.000 - 4.000 ng/mL Final        Assessment / Plan      Assessment/Plan:   Kian Wakefield is a 78-year-old gentleman with cT1c cN0c M0 adenocarcinoma the prostate [place] 4+3 = 7, PSA 10 ng/mL.  He has no clinical or radiographic evidence of regional or distant metastatic disease.  ECOG 0    I discussed the clinical, radiographic and pathologic findings to date with Mr. Wakefield.  I explained the role of radiotherapy in the definitive management of unfavorable intermediate risk prostate cancer, outlining the rationale for this approach.  I explained that the benefit of of radiotherapy must be weighed against the potential acute and chronic toxicities.  I additionally explained that 1 must consider the likelihood of experiencing this benefit given competing comorbidities.  I explained this in plain terms.  Mr. Wakefield is most interested in undergoing external beam radiotherapy as definitive management.  He will be scheduled for hydrogel dissection and fiducial marker placement before undergoing CT simulation and MRI fusion for treatment planning purposes.  He will return to Dr. Avila for a discussion regarding concurrent androgen deprivation therapy.      Donovan Huston MD  Radiation Oncology  [facility]    This document has been signed by Donovan Huston MD on July 12, 2024 14:40 EDT

## 2024-07-16 ENCOUNTER — PREP FOR SURGERY (OUTPATIENT)
Dept: OTHER | Facility: HOSPITAL | Age: 79
End: 2024-07-16
Payer: MEDICARE

## 2024-07-16 DIAGNOSIS — C61 PROSTATE CANCER: Primary | ICD-10-CM

## 2024-07-17 ENCOUNTER — EDUCATION (OUTPATIENT)
Dept: RADIATION ONCOLOGY | Facility: HOSPITAL | Age: 79
End: 2024-07-17
Payer: MEDICARE

## 2024-07-17 NOTE — PROGRESS NOTES
Mr. Wakefield presented to clinic 7/17/2024 for education for his upcoming procedure with Dr. Huston.  Patient is to be scheduled for hydrogel dissection with fiducial marker placement.  Verbal instructions given as well as written instructions were given to patient and family regarding prep for procedure and post op care.  Patient verbalized understanding, instructed to reach out to our office for any questions or concerns.  Antibiotic to be called into patients preferred pharmacy.

## 2024-07-20 NOTE — PROGRESS NOTES
Chief Complaint: Urologic complaint    Subjective         History of Present Illness  Kian Wakefield is a 78 y.o. male           Prostatic adenocarcinoma clinical T1c    Going to have SpaceOAR placed this Thursday  Decided on radiation.    Good stream, no frequency, nocturia x 2.  No prostate meds       MI, 2 coronary stents aspirin 81.      No family HX of  malignancies.       Previous    Followed by cardiology  No pulmonary issues,  No DM    Can still get erections,    worried about this, uses tadalafil 2.5 mg as needed     No previous abdominal surgeries.    Brother is in his 80s, father  in his 50s of MI.     PVR         027      Prostate CA    2024 MRI bx  Left apex - 4+3, 2/2, 18%  Right apex - 3+3, 1/2, 3%  Region of interest-3+3, 1/2, 5%    2024 MRI prostate -37 g - PSAd 0.27  PI-RADS 4 - 9 mm lesion right anterior peripheral zone, prostatic apex.    3/24   10.0  3/22     4.7      Objective     Past Medical History:   Diagnosis Date    Cancer     prostate    Hyperlipidemia     Hypertension     Malaria 1969    Myocardial infarction     2 stents placement                         Assessment and Plan    Diagnoses and all orders for this visit:    1. Prostate cancer (Primary)        Patient has decided on XRT for his prostate cancer treatment.  Will do this in commendation with 6 months of ADT.  We discussed side effect including hot flashes, fatigue, osteoporosis, decreased libido, ED.  Patient voiced understanding    22.5 Lupron today      We will also go and start him on Flomax 0.4 mg daily.  Risk and benefits discussed      F/u in 3 months for repeat injection

## 2024-07-22 ENCOUNTER — OFFICE VISIT (OUTPATIENT)
Dept: UROLOGY | Facility: CLINIC | Age: 79
End: 2024-07-22
Payer: MEDICARE

## 2024-07-22 VITALS — BODY MASS INDEX: 28.93 KG/M2 | WEIGHT: 180 LBS | RESPIRATION RATE: 16 BRPM | HEIGHT: 66 IN

## 2024-07-22 DIAGNOSIS — C61 PROSTATE CANCER: Primary | ICD-10-CM

## 2024-07-22 PROCEDURE — 1160F RVW MEDS BY RX/DR IN RCRD: CPT | Performed by: UROLOGY

## 2024-07-22 PROCEDURE — 1159F MED LIST DOCD IN RCRD: CPT | Performed by: UROLOGY

## 2024-07-22 PROCEDURE — 96402 CHEMO HORMON ANTINEOPL SQ/IM: CPT | Performed by: UROLOGY

## 2024-07-22 PROCEDURE — 99214 OFFICE O/P EST MOD 30 MIN: CPT | Performed by: UROLOGY

## 2024-07-22 RX ORDER — TAMSULOSIN HYDROCHLORIDE 0.4 MG/1
1 CAPSULE ORAL DAILY
Qty: 90 CAPSULE | Refills: 4 | Status: SHIPPED | OUTPATIENT
Start: 2024-07-22

## 2024-07-22 RX ORDER — SULFAMETHOXAZOLE AND TRIMETHOPRIM 800; 160 MG/1; MG/1
1 TABLET ORAL 2 TIMES DAILY
Qty: 6 TABLET | Refills: 0 | Status: SHIPPED | OUTPATIENT
Start: 2024-07-24

## 2024-07-24 NOTE — PRE-PROCEDURE INSTRUCTIONS
IMPORTANT INSTRUCTIONS - PRE-ADMISSION TESTING  DO NOT EAT OR CHEW anything after midnight the night before your procedure.    You may have CLEAR liquids up to ___2_ hours prior to ARRIVAL time.   Take the following medications the morning of your procedure with JUST A SIP OF WATER:  __METOPROLOL, ZETIA, BACTRIM, FLOMAX _____________________________________________________________________________________________________________________________________________________________________________________    DO NOT BRING your medications to the hospital with you, UNLESS something has changed since your PRE-Admission Testing appointment.  Hold all vitamins, supplements, and NSAIDS (Non- steroidal anti-inflammatory meds) for one week prior to surgery (you MAY take Tylenol or Acetaminophen).  If you are diabetic, check your blood sugar the morning of your procedure. If it is less than 70 or if you are feeling symptomatic, call the following number for further instructions: [phone]_.  Use your inhalers/nebulizers as usual, the morning of your procedure. BRING YOUR INHALERS with you.   Bring your CPAP or BIPAP to hospital, ONLY IF YOU WILL BE SPENDING THE NIGHT.   Make sure you have a ride home and have someone who will stay with you the day of your procedure after you go home.  If you have any questions, please call your Pre-Admission Testing Nurse, DENIS__ at [phone]______.   Per anesthesia request, do not smoke for 24 hours before your procedure or as instructed by your surgeon.    Clear Liquid Diet        Find out when you need to start a clear liquid diet.   Think of “clear liquids” as anything you could read a newspaper through. This includes things like water, broth, sports drinks, or tea WITHOUT any kind of milk or cream.           Once you are told to start a clear liquid diet, only drink these things until 2 hours before arrival to the hospital or when the hospital says to stop. Total volume limitation:  8 oz.       Clear liquids you CAN drink:   Water   Clear broth: beef, chicken, vegetable, or bone broth with nothing in it   Gatorade   Lemonade or Vasu-aid   Soda   Tea, coffee (NO cream or honey)   Jell-O (without fruit)   Popsicles (without fruit or cream)   Italian ices   Juice without pulp: apple, white, grape   You may use salt, pepper, and sugar    Do NOT drink:   Milk or cream   Soy milk, almond milk, coconut milk, or other non-dairy drinks and   creamers   Milkshakes or smoothies   Tomato juice   Orange juice   Grapefruit juice   Cream soups or any other than broth         Clear Liquid Diet:  Do NOT eat any solid food.  Do NOT eat or suck on mints or candy.  Do NOT chew gum.  Do NOT drink thick liquids like milk or juice with pulp in it.  Do NOT add milk, cream, or anything like soy milk or almond milk to coffee or tea.

## 2024-07-25 ENCOUNTER — HOSPITAL ENCOUNTER (OUTPATIENT)
Facility: HOSPITAL | Age: 79
Setting detail: HOSPITAL OUTPATIENT SURGERY
Discharge: HOME | End: 2024-07-25
Attending: RADIOLOGY | Admitting: RADIOLOGY
Payer: MEDICARE

## 2024-07-25 ENCOUNTER — ANESTHESIA EVENT (OUTPATIENT)
Dept: PERIOP | Facility: HOSPITAL | Age: 79
End: 2024-07-25
Payer: MEDICARE

## 2024-07-25 ENCOUNTER — ANESTHESIA (OUTPATIENT)
Dept: PERIOP | Facility: HOSPITAL | Age: 79
End: 2024-07-25
Payer: MEDICARE

## 2024-07-25 VITALS
TEMPERATURE: 98.2 F | HEIGHT: 66 IN | DIASTOLIC BLOOD PRESSURE: 67 MMHG | HEART RATE: 72 BPM | RESPIRATION RATE: 16 BRPM | WEIGHT: 180.78 LBS | OXYGEN SATURATION: 97 % | SYSTOLIC BLOOD PRESSURE: 120 MMHG | BODY MASS INDEX: 29.05 KG/M2

## 2024-07-25 PROCEDURE — 25810000003 LACTATED RINGERS PER 1000 ML: Performed by: ANESTHESIOLOGY

## 2024-07-25 PROCEDURE — C1889 IMPLANT/INSERT DEVICE, NOC: HCPCS | Performed by: RADIOLOGY

## 2024-07-25 PROCEDURE — 25010000002 PROPOFOL 10 MG/ML EMULSION

## 2024-07-25 PROCEDURE — 25010000002 MIDAZOLAM PER 1MG: Performed by: ANESTHESIOLOGY

## 2024-07-25 PROCEDURE — 25010000002 ONDANSETRON PER 1 MG

## 2024-07-25 PROCEDURE — A4648 IMPLANTABLE TISSUE MARKER: HCPCS | Performed by: RADIOLOGY

## 2024-07-25 PROCEDURE — 25010000002 FENTANYL CITRATE (PF) 50 MCG/ML SOLUTION

## 2024-07-25 PROCEDURE — 25010000002 DEXAMETHASONE PER 1 MG

## 2024-07-25 DEVICE — MARKR FIDUCL KNURL 20CM/NDL 18G 1.0MM GLD: Type: IMPLANTABLE DEVICE | Site: PERIANAL | Status: FUNCTIONAL

## 2024-07-25 DEVICE — INJ GEL PROST/RECTL/SPACR BARRIGEL SYR 3ML: Type: IMPLANTABLE DEVICE | Site: PERIANAL | Status: FUNCTIONAL

## 2024-07-25 RX ORDER — ONDANSETRON 2 MG/ML
INJECTION INTRAMUSCULAR; INTRAVENOUS AS NEEDED
Status: DISCONTINUED | OUTPATIENT
Start: 2024-07-25 | End: 2024-07-25 | Stop reason: SURG

## 2024-07-25 RX ORDER — OXYCODONE HYDROCHLORIDE 5 MG/1
5 TABLET ORAL
Status: DISCONTINUED | OUTPATIENT
Start: 2024-07-25 | End: 2024-07-25 | Stop reason: HOSPADM

## 2024-07-25 RX ORDER — ACETAMINOPHEN 500 MG
1000 TABLET ORAL ONCE
Status: COMPLETED | OUTPATIENT
Start: 2024-07-25 | End: 2024-07-25

## 2024-07-25 RX ORDER — LIDOCAINE HYDROCHLORIDE 20 MG/ML
INJECTION, SOLUTION EPIDURAL; INFILTRATION; INTRACAUDAL; PERINEURAL AS NEEDED
Status: DISCONTINUED | OUTPATIENT
Start: 2024-07-25 | End: 2024-07-25 | Stop reason: SURG

## 2024-07-25 RX ORDER — ONDANSETRON 2 MG/ML
4 INJECTION INTRAMUSCULAR; INTRAVENOUS ONCE AS NEEDED
Status: DISCONTINUED | OUTPATIENT
Start: 2024-07-25 | End: 2024-07-25 | Stop reason: HOSPADM

## 2024-07-25 RX ORDER — PROPOFOL 10 MG/ML
VIAL (ML) INTRAVENOUS AS NEEDED
Status: DISCONTINUED | OUTPATIENT
Start: 2024-07-25 | End: 2024-07-25 | Stop reason: SURG

## 2024-07-25 RX ORDER — FENTANYL CITRATE 50 UG/ML
INJECTION, SOLUTION INTRAMUSCULAR; INTRAVENOUS AS NEEDED
Status: DISCONTINUED | OUTPATIENT
Start: 2024-07-25 | End: 2024-07-25 | Stop reason: SURG

## 2024-07-25 RX ORDER — EPHEDRINE SULFATE 50 MG/ML
INJECTION INTRAVENOUS AS NEEDED
Status: DISCONTINUED | OUTPATIENT
Start: 2024-07-25 | End: 2024-07-25 | Stop reason: SURG

## 2024-07-25 RX ORDER — DEXAMETHASONE SODIUM PHOSPHATE 4 MG/ML
INJECTION, SOLUTION INTRA-ARTICULAR; INTRALESIONAL; INTRAMUSCULAR; INTRAVENOUS; SOFT TISSUE AS NEEDED
Status: DISCONTINUED | OUTPATIENT
Start: 2024-07-25 | End: 2024-07-25 | Stop reason: SURG

## 2024-07-25 RX ORDER — MIDAZOLAM HYDROCHLORIDE 2 MG/2ML
1 INJECTION, SOLUTION INTRAMUSCULAR; INTRAVENOUS ONCE
Status: COMPLETED | OUTPATIENT
Start: 2024-07-25 | End: 2024-07-25

## 2024-07-25 RX ORDER — SODIUM CHLORIDE, SODIUM LACTATE, POTASSIUM CHLORIDE, CALCIUM CHLORIDE 600; 310; 30; 20 MG/100ML; MG/100ML; MG/100ML; MG/100ML
9 INJECTION, SOLUTION INTRAVENOUS CONTINUOUS PRN
Status: DISCONTINUED | OUTPATIENT
Start: 2024-07-25 | End: 2024-07-25 | Stop reason: HOSPADM

## 2024-07-25 RX ADMIN — EPHEDRINE SULFATE 10 MG: 50 INJECTION INTRAVENOUS at 13:53

## 2024-07-25 RX ADMIN — SODIUM CHLORIDE, POTASSIUM CHLORIDE, SODIUM LACTATE AND CALCIUM CHLORIDE 9 ML/HR: 600; 310; 30; 20 INJECTION, SOLUTION INTRAVENOUS at 13:32

## 2024-07-25 RX ADMIN — FENTANYL CITRATE 25 MCG: 50 INJECTION, SOLUTION INTRAMUSCULAR; INTRAVENOUS at 13:45

## 2024-07-25 RX ADMIN — ACETAMINOPHEN 1000 MG: 500 TABLET ORAL at 12:35

## 2024-07-25 RX ADMIN — EPHEDRINE SULFATE 10 MG: 50 INJECTION INTRAVENOUS at 14:01

## 2024-07-25 RX ADMIN — DEXAMETHASONE SODIUM PHOSPHATE 4 MG: 4 INJECTION, SOLUTION INTRAMUSCULAR; INTRAVENOUS at 13:43

## 2024-07-25 RX ADMIN — LIDOCAINE HYDROCHLORIDE 100 MG: 20 INJECTION, SOLUTION INTRAVENOUS at 13:40

## 2024-07-25 RX ADMIN — FENTANYL CITRATE 25 MCG: 50 INJECTION, SOLUTION INTRAMUSCULAR; INTRAVENOUS at 13:57

## 2024-07-25 RX ADMIN — PROPOFOL 150 MG: 10 INJECTION, EMULSION INTRAVENOUS at 13:40

## 2024-07-25 RX ADMIN — MIDAZOLAM HYDROCHLORIDE 1 MG: 1 INJECTION, SOLUTION INTRAMUSCULAR; INTRAVENOUS at 13:30

## 2024-07-25 RX ADMIN — ONDANSETRON HYDROCHLORIDE 4 MG: 2 SOLUTION INTRAMUSCULAR; INTRAVENOUS at 13:43

## 2024-07-25 NOTE — ANESTHESIA PREPROCEDURE EVALUATION
" Anesthesia Evaluation     Patient summary reviewed and Nursing notes reviewed   no history of anesthetic complications:   NPO Solid Status: > 8 hours  NPO Liquid Status: > 2 hours           Airway   Mallampati: II  TM distance: >3 FB  Neck ROM: full  No difficulty expected  Dental    (+) upper dentures    Pulmonary - negative pulmonary ROS and normal exam    breath sounds clear to auscultation  Cardiovascular   Exercise tolerance: good (4-7 METS)    Rhythm: regular  Rate: normal    (+) hypertension, past MI , CAD, cardiac stents , murmur, hyperlipidemia      Neuro/Psych- negative ROS  GI/Hepatic/Renal/Endo - negative ROS     Musculoskeletal     Abdominal    Substance History   (+) alcohol use     OB/GYN          Other      history of cancer    ROS/Med Hx Other: PAT Nursing Notes unavailable.              Cardiology Note 20224  \"Assessment / Plan patient doing very well. He has no angina. Hypertension under control. Hypercholesterolemia under treatment. We'll continue with his current medication. Follow-up with me in 1 year.\"    Oral Poe  Cardiology        Anesthesia Plan    ASA 3     general     (Patient understands anesthesia not responsible for dental damage.)  intravenous induction     Anesthetic plan, risks, benefits, and alternatives have been provided, discussed and informed consent has been obtained with: patient.    Plan discussed with CRNA.        CODE STATUS:         "

## 2024-07-25 NOTE — OP NOTE
Barrigel Procedure Note  Procedure Date: 7/25/2024      Pre/Post Diagnosis: C61 Malignant Neoplasm of Prostate    Rationale/Reason for Procedure: Organ at-risk protection for radiation therapy delivery      Procedure:  The rectum was voided prior to initiation of the procedure.     The patient was positioned in the dorsal lithotomy position. The transrectal ultrasound probe was positioned to enable guidance of the needle into the space between the prostate and the rectum. Three gold fiducial markers were placed in to the prostate gland. Under transrectal ultrasound guidance, a 30 cm 18-gauge needle was inserted through the rectourethralis muscle and the needle tip advanced into the perirectal fat posterior to the prostate all by using a transperineal approach and with side-fire transrectal ultrasound guidance.  The needle position was confirmed in both the sagittal and axial planes. With the needle tip at mid gland the axial plane was reviewed to confirm the needle was not in the rectal wall (movement of the needle tip without corresponding movement of the rectal wall confirmed perirectal placement). The assembled Barrigel delivery system was then attached to the 18-gauge needle.  Under ultrasound guidance (sagittal plan), a smooth, continuous injection technique was used to dispense the Barrigel hydrogel into the space between the prostate and the rectum (Denonvilliers' fascia and the anterior rectal wall).  A total of three syringes were employed. Optimal visualization of the needle during hydrogel administration was maintained at all times.                            Complications:     No suspected penetration or compromise of the rectal wall occurred.    Donovan Huston MD / 7/25/2024 / 14:23 EDT  Radiation Oncologist Signature / Date / Time

## 2024-07-25 NOTE — ANESTHESIA POSTPROCEDURE EVALUATION
Patient: iKan Wakefield    Procedure Summary       Date: 07/25/24 Room / Location: [facility]  /  [facility]    Anesthesia Start: 1337 Anesthesia Stop: 1419    Procedure: hydrogel spacer placement with fiducial marker placement (Perineum) Diagnosis:       Prostate cancer      (Prostate cancer [C61])    Surgeons: Donovan Huston MD Provider: Rush Strickland MD    Anesthesia Type: general ASA Status: 3            Anesthesia Type: general    Vitals  Vitals Value Taken Time   /67 07/25/24 1451   Temp 36.8 °C (98.2 °F) 07/25/24 1455   Pulse 72 07/25/24 1455   Resp 16 07/25/24 1455   SpO2 97 % 07/25/24 1455           Post Anesthesia Care and Evaluation    Patient location during evaluation: bedside  Patient participation: complete - patient participated  Level of consciousness: awake  Pain management: adequate    Airway patency: patent  PONV Status: none  Cardiovascular status: acceptable and stable  Respiratory status: acceptable  Hydration status: acceptable

## 2024-07-25 NOTE — DISCHARGE INSTRUCTIONS
Space OAR Placement: Instructions for After Care    Do not drive or sign any legal documents for the next 24 hours  Do not ride or operate any heavy machinery (motorcycles, horses, lawnmower, etc.) in the next 72 hours  Light bleeding, like spotting may be noticed from the perineum area for up to 24 hours after the procedure.  Eat light foods that do not cause GI upset (nausea, diarrhea) for the next 72 hours  Complete antibiotics as prescribed.       Contact the office if having any difficulty with urination, blood in stool/urine or any signs or infection (fever).      Nursing office Monday- Friday 8am-4pm:   [phone]  If after hours please contact PCP or go to the nearest ER or Urgent Care

## 2024-07-25 NOTE — H&P
Inpatient Consult       Patient: Kian Wakefield   YOB: 1945   Medical Record Number: [id number]   Date of Consult  2024  Primary Diagnosis:  prostate cancer                                               History of Present Illness:Mr. Wakefield underwent serial PSA testing revealing elevated PSA.  PSA in 2022 was 4.7 ng/mL, increasing to 10 ng/mL in 3/2024.  This prompted MRI guided biopsy of the prostate with MRI on 2024 revealing a 9 mm PI-RADS 4 lesion in the right anterior peripheral zone at the prostatic apex.  Pathology from biopsy performed on 2024 revealed [place] 4+3 equal 7 in 2 of 2 cores from the left apex as well as [place] 3+3 equal 6 from the right apex and region of interest involving 1 of 2 cores, respectively.  He reports feeling well overall with essentially no complaints.  He does have some urinary urgency but denies dysuria or hematuria.  He reports normal bowel movements and underwent colonoscopy approximately 2 years ago.     Review of Systems: The remainder of his review of systems is otherwise negative.        Past Medical History:   Diagnosis Date    Cancer     prostate    Hyperlipidemia     Hypertension     Malaria 1969    Myocardial infarction     2 stents placement        Past Surgical History:   Procedure Laterality Date    CARDIAC CATHETERIZATION      COLONOSCOPY      CORONARY ANGIOPLASTY WITH STENT PLACEMENT      PROSTATE BIOPSY N/A 2024    Procedure: PROSTATE ULTRASOUND BIOPSY MRI FUSION WITH URONAV;  Surgeon: Levi Avila MD;  Location: [facility];  Service: Urology;  Laterality: N/A;      Family History   Problem Relation Age of Onset    Heart attack Father          at 52    Cancer Brother         lung    Heart attack Brother         x 3  - total of 11 brother      Malig Hyperthermia Neg Hx         Social History     Tobacco Use    Smoking status: Never     Passive exposure: Never    Smokeless tobacco: Never   Vaping Use     "Vaping status: Never Used   Substance Use Topics    Alcohol use: Yes     Comment: whiskey nightly   - 2 drinks per night    Drug use: Never        Levofloxacin     Current Facility-Administered Medications:     lactated ringers infusion, 9 mL/hr, Intravenous, Continuous PRN, Rush Strickland MD    Midazolam HCl (PF) (VERSED) injection 1 mg, 1 mg, Intravenous, Once, Rush Strickland MD     Physical Examination:  Vitals:  VSRANGES@     Height: 167.6 cm (66\")  Weight:       07/25/24  1151   Weight: 82 kg (180 lb 12.4 oz)        Constitutional: The patient is a well-developed, well-nourished White or  [1] male  in no acute distress.  Alert and oriented ×3.  Eyes: PERRLA.  EOMI.  ENMT:  Ears and nose WNL.  No lesions noted in the oral cavity or oropharynx.  Respiratory: Normal respiratory effort  Extremities: No clubbing, cyanosis, or edema.  Neurologic: Cranial nerves II through XII are grossly intact, with no focal neurological deficits noted on exam.  Psychiatric: Alert and oriented x3. Normal affect, with no anxiety or depression noted.        ASSESSMENT/PLAN: Kian Wakefield is a 78-year-old gentleman with cT1c cN0c M0 adenocarcinoma the prostate [place] 4+3 = 7, PSA 10 ng/mL.  He has no clinical or radiographic evidence of regional or distant metastatic disease.  ECOG 0     Discussion regarding risks and potential benefits of the procedure. Mr. Wakefield is agreeable to these procedures(hydrogel dissection and fiducial marker placement) today.  "

## 2024-07-29 ENCOUNTER — HOSPITAL ENCOUNTER (OUTPATIENT)
Dept: MRI IMAGING | Facility: HOSPITAL | Age: 79
Discharge: HOME | End: 2024-07-29
Payer: MEDICARE

## 2024-07-29 ENCOUNTER — HOSPITAL ENCOUNTER (OUTPATIENT)
Dept: RADIATION ONCOLOGY | Facility: HOSPITAL | Age: 79
Discharge: HOME | End: 2024-07-29
Payer: MEDICARE

## 2024-07-29 DIAGNOSIS — C61 PROSTATE CANCER: ICD-10-CM

## 2024-07-29 DIAGNOSIS — C61 MALIGNANT NEOPLASM OF PROSTATE: ICD-10-CM

## 2024-07-29 LAB
CREAT BLDA-MCNC: 0.8 MG/DL (ref 0.6–1.3)
EGFRCR SERPLBLD CKD-EPI 2021: 91.7 ML/MIN/1.73

## 2024-07-29 PROCEDURE — 82565 ASSAY OF CREATININE: CPT

## 2024-07-31 ENCOUNTER — HOSPITAL ENCOUNTER (OUTPATIENT)
Dept: RADIATION ONCOLOGY | Facility: HOSPITAL | Age: 79
Discharge: HOME | End: 2024-07-31

## 2024-08-05 ENCOUNTER — HOSPITAL ENCOUNTER (OUTPATIENT)
Dept: RADIATION ONCOLOGY | Facility: HOSPITAL | Age: 79
Setting detail: RADIATION/ONCOLOGY SERIES
End: 2024-08-05
Payer: MEDICARE

## 2024-08-06 PROCEDURE — 77338 DESIGN MLC DEVICE FOR IMRT: CPT | Performed by: RADIOLOGY

## 2024-08-06 PROCEDURE — 77300 RADIATION THERAPY DOSE PLAN: CPT | Performed by: RADIOLOGY

## 2024-08-06 PROCEDURE — 77301 RADIOTHERAPY DOSE PLAN IMRT: CPT | Performed by: RADIOLOGY

## 2024-08-07 ENCOUNTER — HOSPITAL ENCOUNTER (OUTPATIENT)
Dept: RADIATION ONCOLOGY | Facility: HOSPITAL | Age: 79
Discharge: HOME | End: 2024-08-07

## 2024-08-07 ENCOUNTER — EDUCATION (OUTPATIENT)
Dept: RADIATION ONCOLOGY | Facility: HOSPITAL | Age: 79
End: 2024-08-07
Payer: MEDICARE

## 2024-08-07 LAB
RAD ONC ARIA COURSE ID: NORMAL
RAD ONC ARIA COURSE INTENT: NORMAL
RAD ONC ARIA COURSE LAST TREATMENT DATE: NORMAL
RAD ONC ARIA COURSE START DATE: NORMAL
RAD ONC ARIA COURSE TREATMENT ELAPSED DAYS: 0
RAD ONC ARIA FIRST TREATMENT DATE: NORMAL
RAD ONC ARIA PLAN FRACTIONS TREATED TO DATE: 1
RAD ONC ARIA PLAN ID: NORMAL
RAD ONC ARIA PLAN PRESCRIBED DOSE PER FRACTION: 2.5 GY
RAD ONC ARIA PLAN PRIMARY REFERENCE POINT: NORMAL
RAD ONC ARIA PLAN TOTAL FRACTIONS PRESCRIBED: 28
RAD ONC ARIA PLAN TOTAL PRESCRIBED DOSE: 7000 CGY
RAD ONC ARIA REFERENCE POINT DOSAGE GIVEN TO DATE: 2.5 GY
RAD ONC ARIA REFERENCE POINT ID: NORMAL
RAD ONC ARIA REFERENCE POINT SESSION DOSAGE GIVEN: 2.5 GY

## 2024-08-07 PROCEDURE — 77385: CPT | Performed by: RADIOLOGY

## 2024-08-07 PROCEDURE — 77014 CHG CT GUIDANCE RADIATION THERAPY FLDS PLACEMENT: CPT | Performed by: RADIOLOGY

## 2024-08-07 PROCEDURE — 77427 RADIATION TX MANAGEMENT X5: CPT | Performed by: RADIOLOGY

## 2024-08-07 NOTE — PROGRESS NOTES
"PATIENT NAME:Kian Wakefield     MRN: [id number]     DX: Prostate cancer    CURRENT FRACTIONS AT TEACHIN/28    EDUCATION GIVEN:    Patient education performed today in clinic.  Education folder with handouts given:    --- Radiation Therapy contact numbers for Main Line, Nurse Line, Treatment area,  and Dietician with instruction on when to use each one.   --- Radiation Therapy packet with site specific information.   --- Radiation Therapy bulleted points  --- Radiation Oncology skin care  --- Fatigue/Energy Conservation Technique Guidelines  --- Short and Long Term Symptoms Management \"Bubble\" Sheet (site specific)    Discussed all handouts. Time given for patient and wife to ask questions.  All questions answered to patient satisfaction.  No further needs or concerns at this time.     OTHER: Patient and spouse seem to have a good grasp of education interacting in several ways and asking appropriate questions. Encouraged patient to call with any questions or concern. Pt and spouse VU.     "

## 2024-08-08 ENCOUNTER — HOSPITAL ENCOUNTER (OUTPATIENT)
Dept: RADIATION ONCOLOGY | Facility: HOSPITAL | Age: 79
Discharge: HOME | End: 2024-08-08

## 2024-08-08 LAB
RAD ONC ARIA COURSE ID: NORMAL
RAD ONC ARIA COURSE INTENT: NORMAL
RAD ONC ARIA COURSE LAST TREATMENT DATE: NORMAL
RAD ONC ARIA COURSE START DATE: NORMAL
RAD ONC ARIA COURSE TREATMENT ELAPSED DAYS: 1
RAD ONC ARIA FIRST TREATMENT DATE: NORMAL
RAD ONC ARIA PLAN FRACTIONS TREATED TO DATE: 2
RAD ONC ARIA PLAN ID: NORMAL
RAD ONC ARIA PLAN PRESCRIBED DOSE PER FRACTION: 2.5 GY
RAD ONC ARIA PLAN PRIMARY REFERENCE POINT: NORMAL
RAD ONC ARIA PLAN TOTAL FRACTIONS PRESCRIBED: 28
RAD ONC ARIA PLAN TOTAL PRESCRIBED DOSE: 7000 CGY
RAD ONC ARIA REFERENCE POINT DOSAGE GIVEN TO DATE: 5 GY
RAD ONC ARIA REFERENCE POINT ID: NORMAL
RAD ONC ARIA REFERENCE POINT SESSION DOSAGE GIVEN: 2.5 GY

## 2024-08-08 PROCEDURE — 77014 CHG CT GUIDANCE RADIATION THERAPY FLDS PLACEMENT: CPT | Performed by: RADIOLOGY

## 2024-08-08 PROCEDURE — 77385: CPT | Performed by: RADIOLOGY

## 2024-08-09 ENCOUNTER — HOSPITAL ENCOUNTER (OUTPATIENT)
Dept: RADIATION ONCOLOGY | Facility: HOSPITAL | Age: 79
Discharge: HOME | End: 2024-08-09

## 2024-08-09 LAB
RAD ONC ARIA COURSE ID: NORMAL
RAD ONC ARIA COURSE INTENT: NORMAL
RAD ONC ARIA COURSE LAST TREATMENT DATE: NORMAL
RAD ONC ARIA COURSE START DATE: NORMAL
RAD ONC ARIA COURSE TREATMENT ELAPSED DAYS: 2
RAD ONC ARIA FIRST TREATMENT DATE: NORMAL
RAD ONC ARIA PLAN FRACTIONS TREATED TO DATE: 3
RAD ONC ARIA PLAN ID: NORMAL
RAD ONC ARIA PLAN PRESCRIBED DOSE PER FRACTION: 2.5 GY
RAD ONC ARIA PLAN PRIMARY REFERENCE POINT: NORMAL
RAD ONC ARIA PLAN TOTAL FRACTIONS PRESCRIBED: 28
RAD ONC ARIA PLAN TOTAL PRESCRIBED DOSE: 7000 CGY
RAD ONC ARIA REFERENCE POINT DOSAGE GIVEN TO DATE: 7.5 GY
RAD ONC ARIA REFERENCE POINT ID: NORMAL
RAD ONC ARIA REFERENCE POINT SESSION DOSAGE GIVEN: 2.5 GY

## 2024-08-09 PROCEDURE — 77014 CHG CT GUIDANCE RADIATION THERAPY FLDS PLACEMENT: CPT | Performed by: RADIOLOGY

## 2024-08-09 PROCEDURE — 77385: CPT | Performed by: RADIOLOGY

## 2024-08-12 ENCOUNTER — HOSPITAL ENCOUNTER (OUTPATIENT)
Dept: RADIATION ONCOLOGY | Facility: HOSPITAL | Age: 79
Discharge: HOME | End: 2024-08-12
Payer: MEDICARE

## 2024-08-12 LAB
RAD ONC ARIA COURSE ID: NORMAL
RAD ONC ARIA COURSE INTENT: NORMAL
RAD ONC ARIA COURSE LAST TREATMENT DATE: NORMAL
RAD ONC ARIA COURSE START DATE: NORMAL
RAD ONC ARIA COURSE TREATMENT ELAPSED DAYS: 5
RAD ONC ARIA FIRST TREATMENT DATE: NORMAL
RAD ONC ARIA PLAN FRACTIONS TREATED TO DATE: 4
RAD ONC ARIA PLAN ID: NORMAL
RAD ONC ARIA PLAN PRESCRIBED DOSE PER FRACTION: 2.5 GY
RAD ONC ARIA PLAN PRIMARY REFERENCE POINT: NORMAL
RAD ONC ARIA PLAN TOTAL FRACTIONS PRESCRIBED: 28
RAD ONC ARIA PLAN TOTAL PRESCRIBED DOSE: 7000 CGY
RAD ONC ARIA REFERENCE POINT DOSAGE GIVEN TO DATE: 10 GY
RAD ONC ARIA REFERENCE POINT ID: NORMAL
RAD ONC ARIA REFERENCE POINT SESSION DOSAGE GIVEN: 2.5 GY

## 2024-08-12 PROCEDURE — 77385: CPT | Performed by: RADIOLOGY

## 2024-08-12 PROCEDURE — 77014 CHG CT GUIDANCE RADIATION THERAPY FLDS PLACEMENT: CPT | Performed by: RADIOLOGY

## 2024-08-13 ENCOUNTER — HOSPITAL ENCOUNTER (OUTPATIENT)
Dept: RADIATION ONCOLOGY | Facility: HOSPITAL | Age: 79
Discharge: HOME | End: 2024-08-13

## 2024-08-13 VITALS
OXYGEN SATURATION: 100 % | RESPIRATION RATE: 16 BRPM | SYSTOLIC BLOOD PRESSURE: 135 MMHG | WEIGHT: 179.01 LBS | DIASTOLIC BLOOD PRESSURE: 65 MMHG | BODY MASS INDEX: 28.89 KG/M2 | HEART RATE: 55 BPM | TEMPERATURE: 97.6 F

## 2024-08-13 DIAGNOSIS — C61 MALIGNANT NEOPLASM OF PROSTATE: Primary | ICD-10-CM

## 2024-08-13 LAB
RAD ONC ARIA COURSE ID: NORMAL
RAD ONC ARIA COURSE INTENT: NORMAL
RAD ONC ARIA COURSE LAST TREATMENT DATE: NORMAL
RAD ONC ARIA COURSE START DATE: NORMAL
RAD ONC ARIA COURSE TREATMENT ELAPSED DAYS: 6
RAD ONC ARIA FIRST TREATMENT DATE: NORMAL
RAD ONC ARIA PLAN FRACTIONS TREATED TO DATE: 5
RAD ONC ARIA PLAN ID: NORMAL
RAD ONC ARIA PLAN PRESCRIBED DOSE PER FRACTION: 2.5 GY
RAD ONC ARIA PLAN PRIMARY REFERENCE POINT: NORMAL
RAD ONC ARIA PLAN TOTAL FRACTIONS PRESCRIBED: 28
RAD ONC ARIA PLAN TOTAL PRESCRIBED DOSE: 7000 CGY
RAD ONC ARIA REFERENCE POINT DOSAGE GIVEN TO DATE: 12.5 GY
RAD ONC ARIA REFERENCE POINT ID: NORMAL
RAD ONC ARIA REFERENCE POINT SESSION DOSAGE GIVEN: 2.5 GY

## 2024-08-13 PROCEDURE — 77336 RADIATION PHYSICS CONSULT: CPT | Performed by: RADIOLOGY

## 2024-08-13 PROCEDURE — 77014 CHG CT GUIDANCE RADIATION THERAPY FLDS PLACEMENT: CPT | Performed by: RADIOLOGY

## 2024-08-13 PROCEDURE — 77385: CPT | Performed by: RADIOLOGY

## 2024-08-13 NOTE — PROGRESS NOTES
On Treatment Visit       Patient: Kian Wakefield   YOB: 1945   Medical Record Number: [id number]     Date of Visit  August 13, 2024   Primary Diagnosis:Malignant neoplasm of prostate [C61]           was seen today for an on treatment visit.  He is receiving radiation therapy to the prostate. He  has received 500 cGy in 2 fractions out of a planned dose of 7000 cGy in 28 fractions.     Today on exam the patient is tolerating radiation therapy well and has no new disease or treatment-related complaints.                                           Review of Systems:   Review of Systems   Constitutional:  Negative for appetite change and fatigue.   HENT:  Negative for sore throat, tinnitus and trouble swallowing.    Respiratory:  Negative for cough and shortness of breath.    Cardiovascular:  Negative for chest pain.   Gastrointestinal:  Negative for abdominal pain, constipation, diarrhea, nausea and vomiting.   Genitourinary:  Negative for difficulty urinating, dysuria, frequency, hematuria and urgency.   Musculoskeletal:  Positive for back pain (ongoing). Negative for arthralgias.   Neurological:  Negative for dizziness and headaches.   Psychiatric/Behavioral:  Positive for sleep disturbance (nocturia x 2).        Vitals:     Vitals:    08/13/24 1042   BP: 135/65   Pulse: 55   Resp: 16   Temp: 97.6 °F (36.4 °C)   SpO2: 100%       Weight:   Wt Readings from Last 3 Encounters:   08/13/24 81.2 kg (179 lb 0.2 oz)   07/25/24 82 kg (180 lb 12.4 oz)   07/22/24 81.6 kg (180 lb)      Pain:    Pain Score    08/13/24 1042   PainSc: 0-No pain         Physical Exam:  Gen: WD/WN; NAD  HEENT: MMM  Trachea: midline  Chest: symmetric  Resp: normal respiratory effort  Extr: warm, well-perfused  Neuro: awake and alert; no aphasia or neglect    Plan: I have reviewed treatment setup notes, checked and approved the daily guidance images.  I reviewed dose delivery, treatment parameters and deemed them appropriate. We  plan to continue radiation therapy as prescribed.          Radiation Oncology    Electronically signed by Donovan Huston MD 8/13/2024  11:11 EDT

## 2024-08-14 ENCOUNTER — HOSPITAL ENCOUNTER (OUTPATIENT)
Dept: RADIATION ONCOLOGY | Facility: HOSPITAL | Age: 79
Discharge: HOME | End: 2024-08-14

## 2024-08-14 LAB
RAD ONC ARIA COURSE ID: NORMAL
RAD ONC ARIA COURSE INTENT: NORMAL
RAD ONC ARIA COURSE LAST TREATMENT DATE: NORMAL
RAD ONC ARIA COURSE START DATE: NORMAL
RAD ONC ARIA COURSE TREATMENT ELAPSED DAYS: 7
RAD ONC ARIA FIRST TREATMENT DATE: NORMAL
RAD ONC ARIA PLAN FRACTIONS TREATED TO DATE: 6
RAD ONC ARIA PLAN ID: NORMAL
RAD ONC ARIA PLAN PRESCRIBED DOSE PER FRACTION: 2.5 GY
RAD ONC ARIA PLAN PRIMARY REFERENCE POINT: NORMAL
RAD ONC ARIA PLAN TOTAL FRACTIONS PRESCRIBED: 28
RAD ONC ARIA PLAN TOTAL PRESCRIBED DOSE: 7000 CGY
RAD ONC ARIA REFERENCE POINT DOSAGE GIVEN TO DATE: 15 GY
RAD ONC ARIA REFERENCE POINT ID: NORMAL
RAD ONC ARIA REFERENCE POINT SESSION DOSAGE GIVEN: 2.5 GY

## 2024-08-14 PROCEDURE — 77014 CHG CT GUIDANCE RADIATION THERAPY FLDS PLACEMENT: CPT | Performed by: RADIOLOGY

## 2024-08-14 PROCEDURE — 77385: CPT | Performed by: RADIOLOGY

## 2024-08-14 PROCEDURE — 77427 RADIATION TX MANAGEMENT X5: CPT | Performed by: RADIOLOGY

## 2024-08-15 ENCOUNTER — HOSPITAL ENCOUNTER (OUTPATIENT)
Dept: RADIATION ONCOLOGY | Facility: HOSPITAL | Age: 79
Discharge: HOME | End: 2024-08-15

## 2024-08-15 LAB
RAD ONC ARIA COURSE ID: NORMAL
RAD ONC ARIA COURSE INTENT: NORMAL
RAD ONC ARIA COURSE LAST TREATMENT DATE: NORMAL
RAD ONC ARIA COURSE START DATE: NORMAL
RAD ONC ARIA COURSE TREATMENT ELAPSED DAYS: 8
RAD ONC ARIA FIRST TREATMENT DATE: NORMAL
RAD ONC ARIA PLAN FRACTIONS TREATED TO DATE: 7
RAD ONC ARIA PLAN ID: NORMAL
RAD ONC ARIA PLAN PRESCRIBED DOSE PER FRACTION: 2.5 GY
RAD ONC ARIA PLAN PRIMARY REFERENCE POINT: NORMAL
RAD ONC ARIA PLAN TOTAL FRACTIONS PRESCRIBED: 28
RAD ONC ARIA PLAN TOTAL PRESCRIBED DOSE: 7000 CGY
RAD ONC ARIA REFERENCE POINT DOSAGE GIVEN TO DATE: 17.5 GY
RAD ONC ARIA REFERENCE POINT ID: NORMAL
RAD ONC ARIA REFERENCE POINT SESSION DOSAGE GIVEN: 2.5 GY

## 2024-08-15 PROCEDURE — 77014 CHG CT GUIDANCE RADIATION THERAPY FLDS PLACEMENT: CPT | Performed by: RADIOLOGY

## 2024-08-15 PROCEDURE — 77385: CPT | Performed by: RADIOLOGY

## 2024-08-16 ENCOUNTER — HOSPITAL ENCOUNTER (OUTPATIENT)
Dept: RADIATION ONCOLOGY | Facility: HOSPITAL | Age: 79
Discharge: HOME | End: 2024-08-16

## 2024-08-16 LAB
RAD ONC ARIA COURSE ID: NORMAL
RAD ONC ARIA COURSE INTENT: NORMAL
RAD ONC ARIA COURSE LAST TREATMENT DATE: NORMAL
RAD ONC ARIA COURSE START DATE: NORMAL
RAD ONC ARIA COURSE TREATMENT ELAPSED DAYS: 9
RAD ONC ARIA FIRST TREATMENT DATE: NORMAL
RAD ONC ARIA PLAN FRACTIONS TREATED TO DATE: 8
RAD ONC ARIA PLAN ID: NORMAL
RAD ONC ARIA PLAN PRESCRIBED DOSE PER FRACTION: 2.5 GY
RAD ONC ARIA PLAN PRIMARY REFERENCE POINT: NORMAL
RAD ONC ARIA PLAN TOTAL FRACTIONS PRESCRIBED: 28
RAD ONC ARIA PLAN TOTAL PRESCRIBED DOSE: 7000 CGY
RAD ONC ARIA REFERENCE POINT DOSAGE GIVEN TO DATE: 20 GY
RAD ONC ARIA REFERENCE POINT ID: NORMAL
RAD ONC ARIA REFERENCE POINT SESSION DOSAGE GIVEN: 2.5 GY

## 2024-08-16 PROCEDURE — 77014 CHG CT GUIDANCE RADIATION THERAPY FLDS PLACEMENT: CPT | Performed by: RADIOLOGY

## 2024-08-16 PROCEDURE — 77385: CPT | Performed by: RADIOLOGY

## 2024-08-19 ENCOUNTER — HOSPITAL ENCOUNTER (OUTPATIENT)
Dept: RADIATION ONCOLOGY | Facility: HOSPITAL | Age: 79
Discharge: HOME | End: 2024-08-19
Payer: MEDICARE

## 2024-08-19 LAB
RAD ONC ARIA COURSE ID: NORMAL
RAD ONC ARIA COURSE INTENT: NORMAL
RAD ONC ARIA COURSE LAST TREATMENT DATE: NORMAL
RAD ONC ARIA COURSE START DATE: NORMAL
RAD ONC ARIA COURSE TREATMENT ELAPSED DAYS: 12
RAD ONC ARIA FIRST TREATMENT DATE: NORMAL
RAD ONC ARIA PLAN FRACTIONS TREATED TO DATE: 9
RAD ONC ARIA PLAN ID: NORMAL
RAD ONC ARIA PLAN PRESCRIBED DOSE PER FRACTION: 2.5 GY
RAD ONC ARIA PLAN PRIMARY REFERENCE POINT: NORMAL
RAD ONC ARIA PLAN TOTAL FRACTIONS PRESCRIBED: 28
RAD ONC ARIA PLAN TOTAL PRESCRIBED DOSE: 7000 CGY
RAD ONC ARIA REFERENCE POINT DOSAGE GIVEN TO DATE: 22.5 GY
RAD ONC ARIA REFERENCE POINT ID: NORMAL
RAD ONC ARIA REFERENCE POINT SESSION DOSAGE GIVEN: 2.5 GY

## 2024-08-19 PROCEDURE — 77014 CHG CT GUIDANCE RADIATION THERAPY FLDS PLACEMENT: CPT | Performed by: RADIOLOGY

## 2024-08-19 PROCEDURE — 77385: CPT | Performed by: RADIOLOGY

## 2024-08-20 ENCOUNTER — HOSPITAL ENCOUNTER (OUTPATIENT)
Dept: RADIATION ONCOLOGY | Facility: HOSPITAL | Age: 79
Discharge: HOME | End: 2024-08-20

## 2024-08-20 VITALS
SYSTOLIC BLOOD PRESSURE: 148 MMHG | WEIGHT: 179.23 LBS | HEART RATE: 52 BPM | OXYGEN SATURATION: 94 % | BODY MASS INDEX: 28.93 KG/M2 | TEMPERATURE: 97.1 F | RESPIRATION RATE: 18 BRPM | DIASTOLIC BLOOD PRESSURE: 84 MMHG

## 2024-08-20 DIAGNOSIS — C61 MALIGNANT NEOPLASM OF PROSTATE: Primary | ICD-10-CM

## 2024-08-20 LAB
RAD ONC ARIA COURSE ID: NORMAL
RAD ONC ARIA COURSE INTENT: NORMAL
RAD ONC ARIA COURSE LAST TREATMENT DATE: NORMAL
RAD ONC ARIA COURSE START DATE: NORMAL
RAD ONC ARIA COURSE TREATMENT ELAPSED DAYS: 13
RAD ONC ARIA FIRST TREATMENT DATE: NORMAL
RAD ONC ARIA PLAN FRACTIONS TREATED TO DATE: 10
RAD ONC ARIA PLAN ID: NORMAL
RAD ONC ARIA PLAN PRESCRIBED DOSE PER FRACTION: 2.5 GY
RAD ONC ARIA PLAN PRIMARY REFERENCE POINT: NORMAL
RAD ONC ARIA PLAN TOTAL FRACTIONS PRESCRIBED: 28
RAD ONC ARIA PLAN TOTAL PRESCRIBED DOSE: 7000 CGY
RAD ONC ARIA REFERENCE POINT DOSAGE GIVEN TO DATE: 25 GY
RAD ONC ARIA REFERENCE POINT ID: NORMAL
RAD ONC ARIA REFERENCE POINT SESSION DOSAGE GIVEN: 2.5 GY

## 2024-08-20 PROCEDURE — 77385: CPT | Performed by: RADIOLOGY

## 2024-08-20 PROCEDURE — 77014 CHG CT GUIDANCE RADIATION THERAPY FLDS PLACEMENT: CPT | Performed by: RADIOLOGY

## 2024-08-20 PROCEDURE — 77336 RADIATION PHYSICS CONSULT: CPT | Performed by: RADIOLOGY

## 2024-08-20 NOTE — PROGRESS NOTES
On Treatment Visit       Patient: Kian Wakefield   YOB: 1945   Medical Record Number: [id number]     Date of Visit  August 20, 2024   Primary Diagnosis:Malignant neoplasm of prostate [C61]           was seen today for an on treatment visit.  He is receiving radiation therapy to the prostate. He  has received 2500 cGy in 10 fractions out of a planned dose of 7000 cGy in 28 fractions.     Today on exam the patient is tolerating radiation therapy well and has no new disease or treatment-related complaints.                                           Review of Systems:   Review of Systems   Constitutional:  Negative for appetite change and fatigue.   HENT:  Negative for sore throat, tinnitus and trouble swallowing.    Respiratory:  Negative for cough and shortness of breath.    Cardiovascular:  Negative for chest pain.   Gastrointestinal:  Negative for abdominal pain, constipation, diarrhea, nausea and vomiting.   Genitourinary:  Positive for difficulty urinating (occasional intermittent stream) and frequency. Negative for dysuria, hematuria and urgency.        Nocturia x 2     Musculoskeletal:  Positive for back pain (ongoing). Negative for arthralgias.   Skin:  Negative for rash.   Neurological:  Negative for dizziness and headaches.   Psychiatric/Behavioral:  Positive for sleep disturbance (nocturia x 2).        Vitals:     Vitals:    08/20/24 1028   BP: 148/84   Pulse: 52   Resp: 18   Temp: 97.1 °F (36.2 °C)   SpO2: 94%       Weight:   Wt Readings from Last 3 Encounters:   08/20/24 81.3 kg (179 lb 3.7 oz)   08/13/24 81.2 kg (179 lb 0.2 oz)   07/25/24 82 kg (180 lb 12.4 oz)      Pain:    Pain Score    08/20/24 1028   PainSc: 0-No pain         Physical Exam:  Gen: WD/WN; NAD  HEENT: MMM  Trachea: midline  Chest: symmetric  Resp: normal respiratory effort  Extr: warm, well-perfused  Neuro: awake and alert; no aphasia or neglect    Plan: I have reviewed treatment setup notes, checked and approved  the daily guidance images.  I reviewed dose delivery, treatment parameters and deemed them appropriate. We plan to continue radiation therapy as prescribed.          Radiation Oncology    Electronically signed by Donovan Huston MD 8/20/2024  11:06 EDT

## 2024-08-21 ENCOUNTER — HOSPITAL ENCOUNTER (OUTPATIENT)
Dept: RADIATION ONCOLOGY | Facility: HOSPITAL | Age: 79
Discharge: HOME | End: 2024-08-21

## 2024-08-21 LAB
RAD ONC ARIA COURSE ID: NORMAL
RAD ONC ARIA COURSE INTENT: NORMAL
RAD ONC ARIA COURSE LAST TREATMENT DATE: NORMAL
RAD ONC ARIA COURSE START DATE: NORMAL
RAD ONC ARIA COURSE TREATMENT ELAPSED DAYS: 14
RAD ONC ARIA FIRST TREATMENT DATE: NORMAL
RAD ONC ARIA PLAN FRACTIONS TREATED TO DATE: 11
RAD ONC ARIA PLAN ID: NORMAL
RAD ONC ARIA PLAN PRESCRIBED DOSE PER FRACTION: 2.5 GY
RAD ONC ARIA PLAN PRIMARY REFERENCE POINT: NORMAL
RAD ONC ARIA PLAN TOTAL FRACTIONS PRESCRIBED: 28
RAD ONC ARIA PLAN TOTAL PRESCRIBED DOSE: 7000 CGY
RAD ONC ARIA REFERENCE POINT DOSAGE GIVEN TO DATE: 27.5 GY
RAD ONC ARIA REFERENCE POINT ID: NORMAL
RAD ONC ARIA REFERENCE POINT SESSION DOSAGE GIVEN: 2.5 GY

## 2024-08-21 PROCEDURE — 77385: CPT | Performed by: RADIOLOGY

## 2024-08-21 PROCEDURE — 77014 CHG CT GUIDANCE RADIATION THERAPY FLDS PLACEMENT: CPT | Performed by: RADIOLOGY

## 2024-08-22 ENCOUNTER — HOSPITAL ENCOUNTER (OUTPATIENT)
Dept: RADIATION ONCOLOGY | Facility: HOSPITAL | Age: 79
Discharge: HOME | End: 2024-08-22

## 2024-08-22 LAB
RAD ONC ARIA COURSE ID: NORMAL
RAD ONC ARIA COURSE INTENT: NORMAL
RAD ONC ARIA COURSE LAST TREATMENT DATE: NORMAL
RAD ONC ARIA COURSE START DATE: NORMAL
RAD ONC ARIA COURSE TREATMENT ELAPSED DAYS: 15
RAD ONC ARIA FIRST TREATMENT DATE: NORMAL
RAD ONC ARIA PLAN FRACTIONS TREATED TO DATE: 12
RAD ONC ARIA PLAN ID: NORMAL
RAD ONC ARIA PLAN PRESCRIBED DOSE PER FRACTION: 2.5 GY
RAD ONC ARIA PLAN PRIMARY REFERENCE POINT: NORMAL
RAD ONC ARIA PLAN TOTAL FRACTIONS PRESCRIBED: 28
RAD ONC ARIA PLAN TOTAL PRESCRIBED DOSE: 7000 CGY
RAD ONC ARIA REFERENCE POINT DOSAGE GIVEN TO DATE: 30 GY
RAD ONC ARIA REFERENCE POINT ID: NORMAL
RAD ONC ARIA REFERENCE POINT SESSION DOSAGE GIVEN: 2.5 GY

## 2024-08-22 PROCEDURE — 77014 CHG CT GUIDANCE RADIATION THERAPY FLDS PLACEMENT: CPT | Performed by: RADIOLOGY

## 2024-08-22 PROCEDURE — 77385: CPT | Performed by: RADIOLOGY

## 2024-08-23 ENCOUNTER — HOSPITAL ENCOUNTER (OUTPATIENT)
Dept: RADIATION ONCOLOGY | Facility: HOSPITAL | Age: 79
Discharge: HOME | End: 2024-08-23

## 2024-08-23 LAB
RAD ONC ARIA COURSE ID: NORMAL
RAD ONC ARIA COURSE INTENT: NORMAL
RAD ONC ARIA COURSE LAST TREATMENT DATE: NORMAL
RAD ONC ARIA COURSE START DATE: NORMAL
RAD ONC ARIA COURSE TREATMENT ELAPSED DAYS: 16
RAD ONC ARIA FIRST TREATMENT DATE: NORMAL
RAD ONC ARIA PLAN FRACTIONS TREATED TO DATE: 13
RAD ONC ARIA PLAN ID: NORMAL
RAD ONC ARIA PLAN PRESCRIBED DOSE PER FRACTION: 2.5 GY
RAD ONC ARIA PLAN PRIMARY REFERENCE POINT: NORMAL
RAD ONC ARIA PLAN TOTAL FRACTIONS PRESCRIBED: 28
RAD ONC ARIA PLAN TOTAL PRESCRIBED DOSE: 7000 CGY
RAD ONC ARIA REFERENCE POINT DOSAGE GIVEN TO DATE: 32.5 GY
RAD ONC ARIA REFERENCE POINT ID: NORMAL
RAD ONC ARIA REFERENCE POINT SESSION DOSAGE GIVEN: 2.5 GY

## 2024-08-23 PROCEDURE — 77014 CHG CT GUIDANCE RADIATION THERAPY FLDS PLACEMENT: CPT | Performed by: RADIOLOGY

## 2024-08-23 PROCEDURE — 77385: CPT | Performed by: RADIOLOGY

## 2024-08-26 ENCOUNTER — HOSPITAL ENCOUNTER (OUTPATIENT)
Dept: RADIATION ONCOLOGY | Facility: HOSPITAL | Age: 79
Discharge: HOME | End: 2024-08-26
Payer: MEDICARE

## 2024-08-26 LAB
RAD ONC ARIA COURSE ID: NORMAL
RAD ONC ARIA COURSE INTENT: NORMAL
RAD ONC ARIA COURSE LAST TREATMENT DATE: NORMAL
RAD ONC ARIA COURSE START DATE: NORMAL
RAD ONC ARIA COURSE TREATMENT ELAPSED DAYS: 19
RAD ONC ARIA FIRST TREATMENT DATE: NORMAL
RAD ONC ARIA PLAN FRACTIONS TREATED TO DATE: 14
RAD ONC ARIA PLAN ID: NORMAL
RAD ONC ARIA PLAN PRESCRIBED DOSE PER FRACTION: 2.5 GY
RAD ONC ARIA PLAN PRIMARY REFERENCE POINT: NORMAL
RAD ONC ARIA PLAN TOTAL FRACTIONS PRESCRIBED: 28
RAD ONC ARIA PLAN TOTAL PRESCRIBED DOSE: 7000 CGY
RAD ONC ARIA REFERENCE POINT DOSAGE GIVEN TO DATE: 35 GY
RAD ONC ARIA REFERENCE POINT ID: NORMAL
RAD ONC ARIA REFERENCE POINT SESSION DOSAGE GIVEN: 2.5 GY

## 2024-08-26 PROCEDURE — 77014 CHG CT GUIDANCE RADIATION THERAPY FLDS PLACEMENT: CPT | Performed by: RADIOLOGY

## 2024-08-26 PROCEDURE — 77385: CPT | Performed by: RADIOLOGY

## 2024-08-27 ENCOUNTER — HOSPITAL ENCOUNTER (OUTPATIENT)
Dept: RADIATION ONCOLOGY | Facility: HOSPITAL | Age: 79
Discharge: HOME | End: 2024-08-27

## 2024-08-27 VITALS
DIASTOLIC BLOOD PRESSURE: 80 MMHG | BODY MASS INDEX: 29.14 KG/M2 | OXYGEN SATURATION: 100 % | RESPIRATION RATE: 18 BRPM | TEMPERATURE: 97 F | SYSTOLIC BLOOD PRESSURE: 144 MMHG | WEIGHT: 180.56 LBS | HEART RATE: 57 BPM

## 2024-08-27 DIAGNOSIS — C61 MALIGNANT NEOPLASM OF PROSTATE: Primary | ICD-10-CM

## 2024-08-27 LAB
RAD ONC ARIA COURSE ID: NORMAL
RAD ONC ARIA COURSE INTENT: NORMAL
RAD ONC ARIA COURSE LAST TREATMENT DATE: NORMAL
RAD ONC ARIA COURSE START DATE: NORMAL
RAD ONC ARIA COURSE TREATMENT ELAPSED DAYS: 20
RAD ONC ARIA FIRST TREATMENT DATE: NORMAL
RAD ONC ARIA PLAN FRACTIONS TREATED TO DATE: 15
RAD ONC ARIA PLAN ID: NORMAL
RAD ONC ARIA PLAN PRESCRIBED DOSE PER FRACTION: 2.5 GY
RAD ONC ARIA PLAN PRIMARY REFERENCE POINT: NORMAL
RAD ONC ARIA PLAN TOTAL FRACTIONS PRESCRIBED: 28
RAD ONC ARIA PLAN TOTAL PRESCRIBED DOSE: 7000 CGY
RAD ONC ARIA REFERENCE POINT DOSAGE GIVEN TO DATE: 37.5 GY
RAD ONC ARIA REFERENCE POINT ID: NORMAL
RAD ONC ARIA REFERENCE POINT SESSION DOSAGE GIVEN: 2.5 GY

## 2024-08-27 PROCEDURE — 77336 RADIATION PHYSICS CONSULT: CPT | Performed by: RADIOLOGY

## 2024-08-27 PROCEDURE — 77014 CHG CT GUIDANCE RADIATION THERAPY FLDS PLACEMENT: CPT | Performed by: RADIOLOGY

## 2024-08-27 PROCEDURE — 77385: CPT | Performed by: RADIOLOGY

## 2024-08-27 NOTE — PROGRESS NOTES
On Treatment Visit       Patient: Kian Wakefield   YOB: 1945   Medical Record Number: [id number]     Date of Visit  August 27, 2024   Primary Diagnosis:Malignant neoplasm of prostate [C61]           was seen today for an on treatment visit.  He is receiving radiation therapy to the prostate. He  has received 3750 cGy in 15 fractions out of a planned dose of 7000 cGy in 28 fractions.     Today on exam the patient is tolerating radiation therapy well and has no new disease or treatment-related complaints.                                           Review of Systems:   Review of Systems   Constitutional:  Negative for appetite change and fatigue.   HENT:  Negative for sore throat, tinnitus and trouble swallowing.    Respiratory:  Negative for cough and shortness of breath.    Cardiovascular:  Negative for chest pain.   Gastrointestinal:  Negative for abdominal pain, constipation, diarrhea, nausea and vomiting.   Genitourinary:  Positive for difficulty urinating (occasional intermittent stream) and frequency. Negative for dysuria, hematuria and urgency.        Nocturia x 2     Musculoskeletal:  Positive for back pain (ongoing). Negative for arthralgias.   Skin:  Negative for rash.   Neurological:  Negative for dizziness, weakness, light-headedness and headaches.   Psychiatric/Behavioral:  Positive for sleep disturbance (nocturia x 2).        Vitals:     Vitals:    08/27/24 1057   BP: 144/80   Pulse: 57   Resp: 18   Temp: 97 °F (36.1 °C)   SpO2: 100%       Weight:   Wt Readings from Last 3 Encounters:   08/27/24 81.9 kg (180 lb 8.9 oz)   08/20/24 81.3 kg (179 lb 3.7 oz)   08/13/24 81.2 kg (179 lb 0.2 oz)      Pain:    Pain Score    08/27/24 1057   PainSc: 0-No pain         Physical Exam:  Gen: WD/WN; NAD  HEENT: MMM  Trachea: midline  Chest: symmetric  Resp: normal respiratory effort  Extr: warm, well-perfused  Neuro: awake and alert; no aphasia or neglect    Plan: I have reviewed treatment setup  notes, checked and approved the daily guidance images.  I reviewed dose delivery, treatment parameters and deemed them appropriate. We plan to continue radiation therapy as prescribed.          Radiation Oncology    Electronically signed by Donovan Huston MD 8/27/2024  13:18 EDT

## 2024-08-28 ENCOUNTER — HOSPITAL ENCOUNTER (OUTPATIENT)
Dept: RADIATION ONCOLOGY | Facility: HOSPITAL | Age: 79
Discharge: HOME | End: 2024-08-28

## 2024-08-28 LAB
RAD ONC ARIA COURSE ID: NORMAL
RAD ONC ARIA COURSE INTENT: NORMAL
RAD ONC ARIA COURSE LAST TREATMENT DATE: NORMAL
RAD ONC ARIA COURSE START DATE: NORMAL
RAD ONC ARIA COURSE TREATMENT ELAPSED DAYS: 21
RAD ONC ARIA FIRST TREATMENT DATE: NORMAL
RAD ONC ARIA PLAN FRACTIONS TREATED TO DATE: 16
RAD ONC ARIA PLAN ID: NORMAL
RAD ONC ARIA PLAN PRESCRIBED DOSE PER FRACTION: 2.5 GY
RAD ONC ARIA PLAN PRIMARY REFERENCE POINT: NORMAL
RAD ONC ARIA PLAN TOTAL FRACTIONS PRESCRIBED: 28
RAD ONC ARIA PLAN TOTAL PRESCRIBED DOSE: 7000 CGY
RAD ONC ARIA REFERENCE POINT DOSAGE GIVEN TO DATE: 40 GY
RAD ONC ARIA REFERENCE POINT ID: NORMAL
RAD ONC ARIA REFERENCE POINT SESSION DOSAGE GIVEN: 2.5 GY

## 2024-08-28 PROCEDURE — 77385: CPT | Performed by: RADIOLOGY

## 2024-08-28 PROCEDURE — 77014 CHG CT GUIDANCE RADIATION THERAPY FLDS PLACEMENT: CPT | Performed by: RADIOLOGY

## 2024-08-29 ENCOUNTER — HOSPITAL ENCOUNTER (OUTPATIENT)
Dept: RADIATION ONCOLOGY | Facility: HOSPITAL | Age: 79
Discharge: HOME | End: 2024-08-29

## 2024-08-29 LAB
RAD ONC ARIA COURSE ID: NORMAL
RAD ONC ARIA COURSE INTENT: NORMAL
RAD ONC ARIA COURSE LAST TREATMENT DATE: NORMAL
RAD ONC ARIA COURSE START DATE: NORMAL
RAD ONC ARIA COURSE TREATMENT ELAPSED DAYS: 22
RAD ONC ARIA FIRST TREATMENT DATE: NORMAL
RAD ONC ARIA PLAN FRACTIONS TREATED TO DATE: 17
RAD ONC ARIA PLAN ID: NORMAL
RAD ONC ARIA PLAN PRESCRIBED DOSE PER FRACTION: 2.5 GY
RAD ONC ARIA PLAN PRIMARY REFERENCE POINT: NORMAL
RAD ONC ARIA PLAN TOTAL FRACTIONS PRESCRIBED: 28
RAD ONC ARIA PLAN TOTAL PRESCRIBED DOSE: 7000 CGY
RAD ONC ARIA REFERENCE POINT DOSAGE GIVEN TO DATE: 42.5 GY
RAD ONC ARIA REFERENCE POINT ID: NORMAL
RAD ONC ARIA REFERENCE POINT SESSION DOSAGE GIVEN: 2.5 GY

## 2024-08-29 PROCEDURE — 77014 CHG CT GUIDANCE RADIATION THERAPY FLDS PLACEMENT: CPT | Performed by: RADIOLOGY

## 2024-08-29 PROCEDURE — 77385: CPT | Performed by: RADIOLOGY

## 2024-08-30 ENCOUNTER — HOSPITAL ENCOUNTER (OUTPATIENT)
Dept: RADIATION ONCOLOGY | Facility: HOSPITAL | Age: 79
Discharge: HOME | End: 2024-08-30

## 2024-08-30 LAB
RAD ONC ARIA COURSE ID: NORMAL
RAD ONC ARIA COURSE INTENT: NORMAL
RAD ONC ARIA COURSE LAST TREATMENT DATE: NORMAL
RAD ONC ARIA COURSE START DATE: NORMAL
RAD ONC ARIA COURSE TREATMENT ELAPSED DAYS: 23
RAD ONC ARIA FIRST TREATMENT DATE: NORMAL
RAD ONC ARIA PLAN FRACTIONS TREATED TO DATE: 18
RAD ONC ARIA PLAN ID: NORMAL
RAD ONC ARIA PLAN PRESCRIBED DOSE PER FRACTION: 2.5 GY
RAD ONC ARIA PLAN PRIMARY REFERENCE POINT: NORMAL
RAD ONC ARIA PLAN TOTAL FRACTIONS PRESCRIBED: 28
RAD ONC ARIA PLAN TOTAL PRESCRIBED DOSE: 7000 CGY
RAD ONC ARIA REFERENCE POINT DOSAGE GIVEN TO DATE: 45 GY
RAD ONC ARIA REFERENCE POINT ID: NORMAL
RAD ONC ARIA REFERENCE POINT SESSION DOSAGE GIVEN: 2.5 GY

## 2024-08-30 PROCEDURE — 77385: CPT | Performed by: RADIOLOGY

## 2024-09-03 ENCOUNTER — HOSPITAL ENCOUNTER (OUTPATIENT)
Dept: RADIATION ONCOLOGY | Facility: HOSPITAL | Age: 79
Setting detail: RADIATION/ONCOLOGY SERIES
End: 2024-09-03
Payer: MEDICARE

## 2024-09-03 ENCOUNTER — HOSPITAL ENCOUNTER (OUTPATIENT)
Dept: RADIATION ONCOLOGY | Facility: HOSPITAL | Age: 79
Discharge: HOME | End: 2024-09-03

## 2024-09-03 VITALS
RESPIRATION RATE: 16 BRPM | OXYGEN SATURATION: 99 % | HEART RATE: 60 BPM | BODY MASS INDEX: 29.04 KG/M2 | WEIGHT: 179.9 LBS | DIASTOLIC BLOOD PRESSURE: 72 MMHG | SYSTOLIC BLOOD PRESSURE: 139 MMHG | TEMPERATURE: 97.9 F

## 2024-09-03 DIAGNOSIS — C61 MALIGNANT NEOPLASM OF PROSTATE: Primary | ICD-10-CM

## 2024-09-03 LAB
RAD ONC ARIA COURSE ID: NORMAL
RAD ONC ARIA COURSE INTENT: NORMAL
RAD ONC ARIA COURSE LAST TREATMENT DATE: NORMAL
RAD ONC ARIA COURSE START DATE: NORMAL
RAD ONC ARIA COURSE TREATMENT ELAPSED DAYS: 27
RAD ONC ARIA FIRST TREATMENT DATE: NORMAL
RAD ONC ARIA PLAN FRACTIONS TREATED TO DATE: 19
RAD ONC ARIA PLAN ID: NORMAL
RAD ONC ARIA PLAN PRESCRIBED DOSE PER FRACTION: 2.5 GY
RAD ONC ARIA PLAN PRIMARY REFERENCE POINT: NORMAL
RAD ONC ARIA PLAN TOTAL FRACTIONS PRESCRIBED: 28
RAD ONC ARIA PLAN TOTAL PRESCRIBED DOSE: 7000 CGY
RAD ONC ARIA REFERENCE POINT DOSAGE GIVEN TO DATE: 47.5 GY
RAD ONC ARIA REFERENCE POINT ID: NORMAL
RAD ONC ARIA REFERENCE POINT SESSION DOSAGE GIVEN: 2.5 GY

## 2024-09-03 PROCEDURE — 77385: CPT | Performed by: RADIOLOGY

## 2024-09-03 PROCEDURE — 77014 CHG CT GUIDANCE RADIATION THERAPY FLDS PLACEMENT: CPT | Performed by: RADIOLOGY

## 2024-09-03 NOTE — PROGRESS NOTES
On Treatment Visit       Patient: Kian Wakefield   YOB: 1945   Medical Record Number: [id number]     Date of Visit  September 3, 2024   Primary Diagnosis:Malignant neoplasm of prostate [C61]           was seen today for an on treatment visit.  He is receiving radiation therapy to the prostate. He  has received 4750 cGy in 19 fractions out of a planned dose of 7000 cGy in 28 fractions.     Today on exam the patient is tolerating radiation therapy well and has no new disease or treatment-related complaints.                                           Review of Systems:   Review of Systems   Constitutional:  Negative for appetite change and fatigue.   HENT:  Negative for sore throat, tinnitus and trouble swallowing.    Respiratory:  Negative for cough and shortness of breath.    Cardiovascular:  Negative for chest pain.   Gastrointestinal:  Negative for abdominal pain, constipation, diarrhea, nausea and vomiting.   Genitourinary:  Positive for frequency. Negative for difficulty urinating (occasional intermittent stream), dysuria, hematuria and urgency.        Nocturia x 2     Musculoskeletal:  Positive for back pain (ongoing). Negative for arthralgias.   Skin:  Negative for rash.   Neurological:  Negative for dizziness, weakness, light-headedness and headaches.   Psychiatric/Behavioral:  Positive for sleep disturbance (nocturia x 2).        Vitals:     Vitals:    09/03/24 1033   BP: 139/72   Pulse: 60   Resp: 16   Temp: 97.9 °F (36.6 °C)   SpO2: 99%       Weight:   Wt Readings from Last 3 Encounters:   09/03/24 81.6 kg (179 lb 14.3 oz)   08/27/24 81.9 kg (180 lb 8.9 oz)   08/20/24 81.3 kg (179 lb 3.7 oz)      Pain:    Pain Score    09/03/24 1033   PainSc: 0-No pain         Physical Exam:  Gen: WD/WN; NAD  HEENT: MMM  Trachea: midline  Chest: symmetric  Resp: normal respiratory effort  Extr: warm, well-perfused  Neuro: awake and alert; no aphasia or neglect    Plan: I have reviewed treatment setup  notes, checked and approved the daily guidance images.  I reviewed dose delivery, treatment parameters and deemed them appropriate. We plan to continue radiation therapy as prescribed.          Radiation Oncology    Electronically signed by Donovan Huston MD 9/3/2024  10:51 EDT

## 2024-09-04 ENCOUNTER — HOSPITAL ENCOUNTER (OUTPATIENT)
Dept: RADIATION ONCOLOGY | Facility: HOSPITAL | Age: 79
Discharge: HOME | End: 2024-09-04

## 2024-09-04 LAB
RAD ONC ARIA COURSE ID: NORMAL
RAD ONC ARIA COURSE INTENT: NORMAL
RAD ONC ARIA COURSE LAST TREATMENT DATE: NORMAL
RAD ONC ARIA COURSE START DATE: NORMAL
RAD ONC ARIA COURSE TREATMENT ELAPSED DAYS: 28
RAD ONC ARIA FIRST TREATMENT DATE: NORMAL
RAD ONC ARIA PLAN FRACTIONS TREATED TO DATE: 20
RAD ONC ARIA PLAN ID: NORMAL
RAD ONC ARIA PLAN PRESCRIBED DOSE PER FRACTION: 2.5 GY
RAD ONC ARIA PLAN PRIMARY REFERENCE POINT: NORMAL
RAD ONC ARIA PLAN TOTAL FRACTIONS PRESCRIBED: 28
RAD ONC ARIA PLAN TOTAL PRESCRIBED DOSE: 7000 CGY
RAD ONC ARIA REFERENCE POINT DOSAGE GIVEN TO DATE: 50 GY
RAD ONC ARIA REFERENCE POINT ID: NORMAL
RAD ONC ARIA REFERENCE POINT SESSION DOSAGE GIVEN: 2.5 GY

## 2024-09-04 PROCEDURE — 77336 RADIATION PHYSICS CONSULT: CPT | Performed by: RADIOLOGY

## 2024-09-04 PROCEDURE — 77014 CHG CT GUIDANCE RADIATION THERAPY FLDS PLACEMENT: CPT | Performed by: RADIOLOGY

## 2024-09-04 PROCEDURE — 77385: CPT | Performed by: RADIOLOGY

## 2024-09-05 ENCOUNTER — HOSPITAL ENCOUNTER (OUTPATIENT)
Dept: RADIATION ONCOLOGY | Facility: HOSPITAL | Age: 79
Discharge: HOME | End: 2024-09-05

## 2024-09-05 LAB
RAD ONC ARIA COURSE ID: NORMAL
RAD ONC ARIA COURSE INTENT: NORMAL
RAD ONC ARIA COURSE LAST TREATMENT DATE: NORMAL
RAD ONC ARIA COURSE START DATE: NORMAL
RAD ONC ARIA COURSE TREATMENT ELAPSED DAYS: 29
RAD ONC ARIA FIRST TREATMENT DATE: NORMAL
RAD ONC ARIA PLAN FRACTIONS TREATED TO DATE: 21
RAD ONC ARIA PLAN ID: NORMAL
RAD ONC ARIA PLAN PRESCRIBED DOSE PER FRACTION: 2.5 GY
RAD ONC ARIA PLAN PRIMARY REFERENCE POINT: NORMAL
RAD ONC ARIA PLAN TOTAL FRACTIONS PRESCRIBED: 28
RAD ONC ARIA PLAN TOTAL PRESCRIBED DOSE: 7000 CGY
RAD ONC ARIA REFERENCE POINT DOSAGE GIVEN TO DATE: 52.5 GY
RAD ONC ARIA REFERENCE POINT ID: NORMAL
RAD ONC ARIA REFERENCE POINT SESSION DOSAGE GIVEN: 2.5 GY

## 2024-09-05 PROCEDURE — 77385: CPT | Performed by: RADIOLOGY

## 2024-09-05 PROCEDURE — 77014 CHG CT GUIDANCE RADIATION THERAPY FLDS PLACEMENT: CPT | Performed by: RADIOLOGY

## 2024-09-06 ENCOUNTER — HOSPITAL ENCOUNTER (OUTPATIENT)
Dept: RADIATION ONCOLOGY | Facility: HOSPITAL | Age: 79
Discharge: HOME | End: 2024-09-06

## 2024-09-06 LAB
RAD ONC ARIA COURSE ID: NORMAL
RAD ONC ARIA COURSE INTENT: NORMAL
RAD ONC ARIA COURSE LAST TREATMENT DATE: NORMAL
RAD ONC ARIA COURSE START DATE: NORMAL
RAD ONC ARIA COURSE TREATMENT ELAPSED DAYS: 30
RAD ONC ARIA FIRST TREATMENT DATE: NORMAL
RAD ONC ARIA PLAN FRACTIONS TREATED TO DATE: 22
RAD ONC ARIA PLAN ID: NORMAL
RAD ONC ARIA PLAN PRESCRIBED DOSE PER FRACTION: 2.5 GY
RAD ONC ARIA PLAN PRIMARY REFERENCE POINT: NORMAL
RAD ONC ARIA PLAN TOTAL FRACTIONS PRESCRIBED: 28
RAD ONC ARIA PLAN TOTAL PRESCRIBED DOSE: 7000 CGY
RAD ONC ARIA REFERENCE POINT DOSAGE GIVEN TO DATE: 55 GY
RAD ONC ARIA REFERENCE POINT ID: NORMAL
RAD ONC ARIA REFERENCE POINT SESSION DOSAGE GIVEN: 2.5 GY

## 2024-09-06 PROCEDURE — 77014 CHG CT GUIDANCE RADIATION THERAPY FLDS PLACEMENT: CPT | Performed by: RADIOLOGY

## 2024-09-06 PROCEDURE — 77385: CPT | Performed by: RADIOLOGY

## 2024-09-09 ENCOUNTER — OFFICE VISIT (OUTPATIENT)
Dept: FAMILY MEDICINE CLINIC | Age: 79
End: 2024-09-09
Payer: MEDICARE

## 2024-09-09 ENCOUNTER — HOSPITAL ENCOUNTER (OUTPATIENT)
Dept: RADIATION ONCOLOGY | Facility: HOSPITAL | Age: 79
Discharge: HOME | End: 2024-09-09
Payer: MEDICARE

## 2024-09-09 VITALS
DIASTOLIC BLOOD PRESSURE: 78 MMHG | BODY MASS INDEX: 29.09 KG/M2 | WEIGHT: 181 LBS | SYSTOLIC BLOOD PRESSURE: 135 MMHG | HEART RATE: 56 BPM | OXYGEN SATURATION: 96 % | HEIGHT: 66 IN | TEMPERATURE: 98.1 F

## 2024-09-09 DIAGNOSIS — I10 PRIMARY HYPERTENSION: Primary | ICD-10-CM

## 2024-09-09 LAB
RAD ONC ARIA COURSE ID: NORMAL
RAD ONC ARIA COURSE INTENT: NORMAL
RAD ONC ARIA COURSE LAST TREATMENT DATE: NORMAL
RAD ONC ARIA COURSE START DATE: NORMAL
RAD ONC ARIA COURSE TREATMENT ELAPSED DAYS: 33
RAD ONC ARIA FIRST TREATMENT DATE: NORMAL
RAD ONC ARIA PLAN FRACTIONS TREATED TO DATE: 23
RAD ONC ARIA PLAN ID: NORMAL
RAD ONC ARIA PLAN PRESCRIBED DOSE PER FRACTION: 2.5 GY
RAD ONC ARIA PLAN PRIMARY REFERENCE POINT: NORMAL
RAD ONC ARIA PLAN TOTAL FRACTIONS PRESCRIBED: 28
RAD ONC ARIA PLAN TOTAL PRESCRIBED DOSE: 7000 CGY
RAD ONC ARIA REFERENCE POINT DOSAGE GIVEN TO DATE: 57.5 GY
RAD ONC ARIA REFERENCE POINT ID: NORMAL
RAD ONC ARIA REFERENCE POINT SESSION DOSAGE GIVEN: 2.5 GY

## 2024-09-09 PROCEDURE — 77014 CHG CT GUIDANCE RADIATION THERAPY FLDS PLACEMENT: CPT | Performed by: RADIOLOGY

## 2024-09-09 PROCEDURE — 1126F AMNT PAIN NOTED NONE PRSNT: CPT | Performed by: NURSE PRACTITIONER

## 2024-09-09 PROCEDURE — 1160F RVW MEDS BY RX/DR IN RCRD: CPT | Performed by: NURSE PRACTITIONER

## 2024-09-09 PROCEDURE — 99213 OFFICE O/P EST LOW 20 MIN: CPT | Performed by: NURSE PRACTITIONER

## 2024-09-09 PROCEDURE — 3078F DIAST BP <80 MM HG: CPT | Performed by: NURSE PRACTITIONER

## 2024-09-09 PROCEDURE — 1159F MED LIST DOCD IN RCRD: CPT | Performed by: NURSE PRACTITIONER

## 2024-09-09 PROCEDURE — 3075F SYST BP GE 130 - 139MM HG: CPT | Performed by: NURSE PRACTITIONER

## 2024-09-09 PROCEDURE — 77385: CPT | Performed by: RADIOLOGY

## 2024-09-09 RX ORDER — TAMSULOSIN HYDROCHLORIDE 0.4 MG/1
1 CAPSULE ORAL DAILY
COMMUNITY

## 2024-09-09 RX ORDER — AMLODIPINE BESYLATE 5 MG/1
5 TABLET ORAL DAILY
COMMUNITY
Start: 2024-08-19

## 2024-09-09 NOTE — PROGRESS NOTES
Chief Complaint  Kian Wakefield presents to [facility] for Hyperlipidemia (6 mo. F/U ) and Hypertension      Subjective     History of Present Illness    Miles presents today for follow up on Hypertension.    Current medication / treatment includes Metoprolol and amlodipine, and is compliant with medications.   no side effects reported.    Home blood pressure monitoring readings reported as home BP checks: not checked  Medication changes are not recommended at this time.  Miles presents today for follow up on hyperlipidemia.  Previous values:   Lab Results   Component Value Date    CHOL 166 03/01/2024    TRIG 109 03/01/2024    HDL 67 (H) 03/01/2024    LDL 80 03/01/2024      The ASCVD Risk score (Heidi LEDESMA, et al., 2019) failed to calculate for the following reasons:    The patient has a prior MI or stroke diagnosis  Currently taking medication :yes and reports compliant with medication which is rosuvastatin (Crestor) 40 mg and ezetimibe(Zetia)  10 mg   No side effects reported from this medication .  No new concerns to discuss today.   Miles is currently under the care of oncology for prostate cancer.  He has 5 more radiation treatments to go and will receive another chemo injection in October.  He is under the care of Levi Avila.      He follows up with cardiology for his heart health , Dr. Poe      Assessment and Plan       Diagnoses and all orders for this visit:    1. Primary hypertension (Primary)  Comments:  Well controlled, refills not needed today, if he should need refills- ok to fill otherwise , follow up with cardiology as recommended            Follow Up   Return in about 6 months (around 3/9/2025) for Medicare Wellness.  Future Appointments   Date Time Provider Department Center   10/18/2024 10:45 AM Levi Avila MD [facility]   3/3/2025  8:15 AM Raissa Patiño APRN [facility]       No orders of the defined types were placed in this  "encounter.      Medications Discontinued During This Encounter   Medication Reason    tamsulosin (FLOMAX) 0.4 MG capsule 24 hr capsule *Therapy completed    sulfamethoxazole-trimethoprim (Bactrim DS) 800-160 MG per tablet *Therapy completed          Review of Systems    Objective     Vitals:    09/09/24 1331   BP: 135/78   BP Location: Left arm   Patient Position: Sitting   Cuff Size: Adult   Pulse: 56   Temp: 98.1 °F (36.7 °C)   TempSrc: Oral   SpO2: 96%   Weight: 82.1 kg (181 lb)   Height: 167.6 cm (66\")     Body mass index is 29.21 kg/m².          Physical Exam  Vitals reviewed.   Constitutional:       Appearance: Normal appearance.   HENT:      Head: Normocephalic.   Eyes:      Pupils: Pupils are equal, round, and reactive to light.   Cardiovascular:      Rate and Rhythm: Normal rate and regular rhythm.      Heart sounds: No murmur heard.  Pulmonary:      Effort: Pulmonary effort is normal.      Breath sounds: Normal breath sounds.   Musculoskeletal:         General: Normal range of motion.   Neurological:      Mental Status: He is alert.   Psychiatric:         Mood and Affect: Mood normal.         Behavior: Behavior normal.            Result Review               Allergies   Allergen Reactions    Levofloxacin Swelling      Past Medical History:   Diagnosis Date    Cancer     prostate    Hyperlipidemia     Hypertension     Malaria 1969    Myocardial infarction 2003    2 stents placement     Current Outpatient Medications   Medication Sig Dispense Refill    amLODIPine (NORVASC) 5 MG tablet Take 1 tablet by mouth Daily.      aspirin 81 MG EC tablet Take 1 tablet by mouth Daily.      cyanocobalamin (VITAMIN B-12) 1000 MCG tablet Take 1 tablet by mouth Daily.      ezetimibe (ZETIA) 10 MG tablet Take 1 tablet by mouth Daily.      metoprolol tartrate (LOPRESSOR) 25 MG tablet 2 (Two) Times a Day. 1/2 tablet daily      rosuvastatin (CRESTOR) 40 MG tablet Take 1 tablet by mouth Every Night.      Zinc 50 MG tablet Take 1 " tablet by mouth Daily.      tamsulosin (FLOMAX) 0.4 MG capsule 24 hr capsule Take 1 capsule by mouth Daily.       No current facility-administered medications for this visit.     Past Surgical History:   Procedure Laterality Date    CARDIAC CATHETERIZATION      COLONOSCOPY      CORONARY ANGIOPLASTY WITH STENT PLACEMENT      PROSTATE BIOPSY N/A 05/09/2024    Procedure: PROSTATE ULTRASOUND BIOPSY MRI FUSION WITH URONAV;  Surgeon: Levi Avila MD;  Location: [facility];  Service: Urology;  Laterality: N/A;      Health Maintenance Due   Topic Date Due    COVID-19 Vaccine (7 - 2023-24 season) 09/01/2024    INFLUENZA VACCINE  08/01/2024      Immunization History   Administered Date(s) Administered    COVID-19 (MODERNA) 1st,2nd,3rd Dose Monovalent 02/04/2021, 03/04/2021, 11/03/2021, 06/09/2022    COVID-19 (MODERNA) BIVALENT 12+YRS 12/01/2022    COVID-19 F23 (PFIZER) 12YRS+ (COMIRNATY) 03/01/2024    Pneumococcal Conjugate 13-Valent (PCV13) 02/22/2022    Pneumococcal Conjugate 20-Valent (PCV20) 02/28/2023    Tdap 02/22/2021         Part of this note may be an electronic transcription/translation of spoken language to printed   text using the Dragon Dictation System.      TIM Schmidt

## 2024-09-10 ENCOUNTER — HOSPITAL ENCOUNTER (OUTPATIENT)
Dept: RADIATION ONCOLOGY | Facility: HOSPITAL | Age: 79
Discharge: HOME | End: 2024-09-10

## 2024-09-10 VITALS
TEMPERATURE: 97.2 F | HEART RATE: 56 BPM | RESPIRATION RATE: 16 BRPM | OXYGEN SATURATION: 99 % | SYSTOLIC BLOOD PRESSURE: 148 MMHG | WEIGHT: 180.34 LBS | BODY MASS INDEX: 29.11 KG/M2 | DIASTOLIC BLOOD PRESSURE: 70 MMHG

## 2024-09-10 DIAGNOSIS — C61 MALIGNANT NEOPLASM OF PROSTATE: Primary | ICD-10-CM

## 2024-09-10 LAB
RAD ONC ARIA COURSE ID: NORMAL
RAD ONC ARIA COURSE INTENT: NORMAL
RAD ONC ARIA COURSE LAST TREATMENT DATE: NORMAL
RAD ONC ARIA COURSE START DATE: NORMAL
RAD ONC ARIA COURSE TREATMENT ELAPSED DAYS: 34
RAD ONC ARIA FIRST TREATMENT DATE: NORMAL
RAD ONC ARIA PLAN FRACTIONS TREATED TO DATE: 24
RAD ONC ARIA PLAN ID: NORMAL
RAD ONC ARIA PLAN PRESCRIBED DOSE PER FRACTION: 2.5 GY
RAD ONC ARIA PLAN PRIMARY REFERENCE POINT: NORMAL
RAD ONC ARIA PLAN TOTAL FRACTIONS PRESCRIBED: 28
RAD ONC ARIA PLAN TOTAL PRESCRIBED DOSE: 7000 CGY
RAD ONC ARIA REFERENCE POINT DOSAGE GIVEN TO DATE: 60 GY
RAD ONC ARIA REFERENCE POINT ID: NORMAL
RAD ONC ARIA REFERENCE POINT SESSION DOSAGE GIVEN: 2.5 GY

## 2024-09-10 PROCEDURE — 77385: CPT | Performed by: RADIOLOGY

## 2024-09-10 PROCEDURE — 77014 CHG CT GUIDANCE RADIATION THERAPY FLDS PLACEMENT: CPT | Performed by: RADIOLOGY

## 2024-09-10 NOTE — PROGRESS NOTES
On Treatment Visit       Patient: Kian Wakefield   YOB: 1945   Medical Record Number: [id number]     Date of Visit  September 10, 2024   Primary Diagnosis:Malignant neoplasm of prostate [C61]           was seen today for an on treatment visit.  He is receiving radiation therapy to the prostate. He  has received 6000 cGy in 24 fractions out of a planned dose of 7000 cGy in 28 fractions.     Today on exam the patient is tolerating radiation therapy well and has no new disease or treatment-related complaints.                                           Review of Systems:   Review of Systems   Constitutional:  Positive for fatigue (1/10). Negative for appetite change.   HENT:  Negative for congestion, sore throat and tinnitus.    Respiratory:  Negative for cough and shortness of breath.    Gastrointestinal:  Negative for abdominal pain, constipation, diarrhea, nausea and vomiting.   Genitourinary:  Negative for difficulty urinating and urgency.   Musculoskeletal:  Positive for back pain. Negative for arthralgias (ongoing) and neck pain.   Neurological:  Negative for dizziness and headaches.   Psychiatric/Behavioral:  Positive for sleep disturbance. Self-injury: up 2x a night.       Vitals:     Vitals:    09/10/24 1047   BP: 148/70   Pulse: 56   Resp: 16   Temp: 97.2 °F (36.2 °C)   SpO2: 99%       Weight:   Wt Readings from Last 3 Encounters:   09/10/24 81.8 kg (180 lb 5.4 oz)   09/09/24 82.1 kg (181 lb)   09/03/24 81.6 kg (179 lb 14.3 oz)      Pain:    Pain Score    09/10/24 1047   PainSc: 0-No pain         Physical Exam:  Gen: WD/WN; NAD  HEENT: MMM  Trachea: midline  Chest: symmetric  Resp: normal respiratory effort  Extr: warm, well-perfused  Neuro: awake and alert; no aphasia or neglect    Plan: I have reviewed treatment setup notes, checked and approved the daily guidance images.  I reviewed dose delivery, treatment parameters and deemed them appropriate. We plan to continue radiation therapy  as prescribed.          Radiation Oncology    Electronically signed by Donovan Huston MD 9/10/2024  11:29 EDT

## 2024-09-11 ENCOUNTER — HOSPITAL ENCOUNTER (OUTPATIENT)
Dept: RADIATION ONCOLOGY | Facility: HOSPITAL | Age: 79
Discharge: HOME | End: 2024-09-11

## 2024-09-11 LAB
RAD ONC ARIA COURSE ID: NORMAL
RAD ONC ARIA COURSE INTENT: NORMAL
RAD ONC ARIA COURSE LAST TREATMENT DATE: NORMAL
RAD ONC ARIA COURSE START DATE: NORMAL
RAD ONC ARIA COURSE TREATMENT ELAPSED DAYS: 35
RAD ONC ARIA FIRST TREATMENT DATE: NORMAL
RAD ONC ARIA PLAN FRACTIONS TREATED TO DATE: 25
RAD ONC ARIA PLAN ID: NORMAL
RAD ONC ARIA PLAN PRESCRIBED DOSE PER FRACTION: 2.5 GY
RAD ONC ARIA PLAN PRIMARY REFERENCE POINT: NORMAL
RAD ONC ARIA PLAN TOTAL FRACTIONS PRESCRIBED: 28
RAD ONC ARIA PLAN TOTAL PRESCRIBED DOSE: 7000 CGY
RAD ONC ARIA REFERENCE POINT DOSAGE GIVEN TO DATE: 62.5 GY
RAD ONC ARIA REFERENCE POINT ID: NORMAL
RAD ONC ARIA REFERENCE POINT SESSION DOSAGE GIVEN: 2.5 GY

## 2024-09-11 PROCEDURE — 77385: CPT | Performed by: RADIOLOGY

## 2024-09-11 PROCEDURE — 77014 CHG CT GUIDANCE RADIATION THERAPY FLDS PLACEMENT: CPT | Performed by: RADIOLOGY

## 2024-09-11 PROCEDURE — 77336 RADIATION PHYSICS CONSULT: CPT | Performed by: RADIOLOGY

## 2024-09-12 ENCOUNTER — HOSPITAL ENCOUNTER (OUTPATIENT)
Dept: RADIATION ONCOLOGY | Facility: HOSPITAL | Age: 79
Discharge: HOME | End: 2024-09-12

## 2024-09-12 LAB
RAD ONC ARIA COURSE ID: NORMAL
RAD ONC ARIA COURSE INTENT: NORMAL
RAD ONC ARIA COURSE LAST TREATMENT DATE: NORMAL
RAD ONC ARIA COURSE START DATE: NORMAL
RAD ONC ARIA COURSE TREATMENT ELAPSED DAYS: 36
RAD ONC ARIA FIRST TREATMENT DATE: NORMAL
RAD ONC ARIA PLAN FRACTIONS TREATED TO DATE: 26
RAD ONC ARIA PLAN ID: NORMAL
RAD ONC ARIA PLAN PRESCRIBED DOSE PER FRACTION: 2.5 GY
RAD ONC ARIA PLAN PRIMARY REFERENCE POINT: NORMAL
RAD ONC ARIA PLAN TOTAL FRACTIONS PRESCRIBED: 28
RAD ONC ARIA PLAN TOTAL PRESCRIBED DOSE: 7000 CGY
RAD ONC ARIA REFERENCE POINT DOSAGE GIVEN TO DATE: 65 GY
RAD ONC ARIA REFERENCE POINT ID: NORMAL
RAD ONC ARIA REFERENCE POINT SESSION DOSAGE GIVEN: 2.5 GY

## 2024-09-12 PROCEDURE — 77385: CPT | Performed by: RADIOLOGY

## 2024-09-13 ENCOUNTER — HOSPITAL ENCOUNTER (OUTPATIENT)
Dept: RADIATION ONCOLOGY | Facility: HOSPITAL | Age: 79
Discharge: HOME | End: 2024-09-13

## 2024-09-13 DIAGNOSIS — C61 MALIGNANT NEOPLASM OF PROSTATE: Primary | ICD-10-CM

## 2024-09-13 LAB
RAD ONC ARIA COURSE ID: NORMAL
RAD ONC ARIA COURSE INTENT: NORMAL
RAD ONC ARIA COURSE LAST TREATMENT DATE: NORMAL
RAD ONC ARIA COURSE START DATE: NORMAL
RAD ONC ARIA COURSE TREATMENT ELAPSED DAYS: 37
RAD ONC ARIA FIRST TREATMENT DATE: NORMAL
RAD ONC ARIA PLAN FRACTIONS TREATED TO DATE: 27
RAD ONC ARIA PLAN ID: NORMAL
RAD ONC ARIA PLAN PRESCRIBED DOSE PER FRACTION: 2.5 GY
RAD ONC ARIA PLAN PRIMARY REFERENCE POINT: NORMAL
RAD ONC ARIA PLAN TOTAL FRACTIONS PRESCRIBED: 28
RAD ONC ARIA PLAN TOTAL PRESCRIBED DOSE: 7000 CGY
RAD ONC ARIA REFERENCE POINT DOSAGE GIVEN TO DATE: 67.5 GY
RAD ONC ARIA REFERENCE POINT ID: NORMAL
RAD ONC ARIA REFERENCE POINT SESSION DOSAGE GIVEN: 2.5 GY

## 2024-09-13 PROCEDURE — 77385: CPT | Performed by: RADIOLOGY

## 2024-09-16 ENCOUNTER — HOSPITAL ENCOUNTER (OUTPATIENT)
Dept: RADIATION ONCOLOGY | Facility: HOSPITAL | Age: 79
Discharge: HOME | End: 2024-09-16
Payer: MEDICARE

## 2024-09-16 LAB
RAD ONC ARIA COURSE ID: NORMAL
RAD ONC ARIA COURSE INTENT: NORMAL
RAD ONC ARIA COURSE LAST TREATMENT DATE: NORMAL
RAD ONC ARIA COURSE START DATE: NORMAL
RAD ONC ARIA COURSE TREATMENT ELAPSED DAYS: 40
RAD ONC ARIA FIRST TREATMENT DATE: NORMAL
RAD ONC ARIA PLAN FRACTIONS TREATED TO DATE: 28
RAD ONC ARIA PLAN ID: NORMAL
RAD ONC ARIA PLAN PRESCRIBED DOSE PER FRACTION: 2.5 GY
RAD ONC ARIA PLAN PRIMARY REFERENCE POINT: NORMAL
RAD ONC ARIA PLAN TOTAL FRACTIONS PRESCRIBED: 28
RAD ONC ARIA PLAN TOTAL PRESCRIBED DOSE: 7000 CGY
RAD ONC ARIA REFERENCE POINT DOSAGE GIVEN TO DATE: 70 GY
RAD ONC ARIA REFERENCE POINT ID: NORMAL
RAD ONC ARIA REFERENCE POINT SESSION DOSAGE GIVEN: 2.5 GY

## 2024-09-16 PROCEDURE — 77385: CPT | Performed by: RADIOLOGY

## 2024-09-16 PROCEDURE — 77336 RADIATION PHYSICS CONSULT: CPT | Performed by: RADIOLOGY

## 2024-10-04 ENCOUNTER — PATIENT ROUNDING (BHMG ONLY) (OUTPATIENT)
Dept: RADIATION ONCOLOGY | Facility: HOSPITAL | Age: 79
End: 2024-10-04
Payer: MEDICARE

## 2024-10-04 NOTE — PROGRESS NOTES
"Patient completed radiation treatment for prostate cancer on 9/16/24. Following up with patient regarding any concerns the patient may have at this time and receiving feedback in regards to patient over all care while under treatment.     Patient asked about symptoms and side effects that continue to be bothersome. Patient reports receiving a Lupron injection from Dr. Avila. He reports fatigue and hot flashes.  Mr. Wakefield denies pain or any urinary symptoms. No skin issues reported.    No medication changes reported. No refills requested.    Patient was asked if there was anything that could've made their experience better at [facility] while they were under treatment. Patient states: \"No, my care was a 1,000%. Everyone there was really nice.\"    Patient encouraged to call the office if any concerns arise. Patient reminded of follow up appointment on 10/28/24 with Mary Kate Jacobs at 11:00.  "

## 2024-10-14 NOTE — PROGRESS NOTES
Chief Complaint: Urologic complaint    Subjective         History of Present Illness  Kian Wakefield is a 78 y.o. male           Prostatic adenocarcinoma clinical T1c      Currently on tamsulosin 0.4 mg daily.  Helping  No incontinence.    Having some hot flashes mildly bothersome    No fatigue    No GH/dysuria    Good stream  Some minor frequency.     nocturia x 2.         MI, 2 coronary stents aspirin 81.      No family HX of  malignancies.       Previous    Followed by cardiology  No pulmonary issues,  No DM    Can still get erections,    worried about this, uses tadalafil 2.5 mg as needed     No previous abdominal surgeries.    Brother is in his 80s, father  in his 50s of MI.     PVR         027        Prostate CA     XRT - Dr. Huston    2024 first Lupron 22.5    2024 MRI bx  Left apex - 4+3, 2/2, 18%  Right apex - 3+3, 1/2, 3%  Region of interest-3+3, 1/2, 5%    2024 MRI prostate -37 g - PSAd 0.27  PI-RADS 4 - 9 mm lesion right anterior peripheral zone, prostatic apex.    3/24   10.0  3/22     4.7      Objective     Past Medical History:   Diagnosis Date    Cancer     prostate    Hyperlipidemia     Hypertension     Malaria 1969    Myocardial infarction     2 stents placement                         Assessment and Plan    Diagnoses and all orders for this visit:    1. Prostate cancer (Primary)        Continue Flomax 0.4 mg daily, refilled today      22.5 Lupron today, last injection      We will also go and start him on Flomax 0.4 mg daily.  Risk and benefits discussed          Follow-up in 4 months with PSA

## 2024-10-18 ENCOUNTER — OFFICE VISIT (OUTPATIENT)
Dept: UROLOGY | Facility: CLINIC | Age: 79
End: 2024-10-18
Payer: MEDICARE

## 2024-10-18 VITALS — HEIGHT: 66 IN | BODY MASS INDEX: 28.93 KG/M2 | WEIGHT: 180 LBS | RESPIRATION RATE: 16 BRPM

## 2024-10-18 DIAGNOSIS — C61 PROSTATE CANCER: Primary | ICD-10-CM

## 2024-10-18 RX ORDER — TAMSULOSIN HYDROCHLORIDE 0.4 MG/1
1 CAPSULE ORAL DAILY
Qty: 30 CAPSULE | Refills: 11 | Status: SHIPPED | OUTPATIENT
Start: 2024-10-18

## 2024-10-28 ENCOUNTER — OFFICE VISIT (OUTPATIENT)
Dept: RADIATION ONCOLOGY | Facility: HOSPITAL | Age: 79
End: 2024-10-28
Payer: MEDICARE

## 2024-10-28 VITALS
OXYGEN SATURATION: 98 % | DIASTOLIC BLOOD PRESSURE: 77 MMHG | BODY MASS INDEX: 29.78 KG/M2 | SYSTOLIC BLOOD PRESSURE: 158 MMHG | TEMPERATURE: 97.8 F | HEART RATE: 53 BPM | RESPIRATION RATE: 16 BRPM | WEIGHT: 184.53 LBS

## 2024-10-28 DIAGNOSIS — C61 MALIGNANT NEOPLASM OF PROSTATE: Primary | ICD-10-CM

## 2024-10-28 DIAGNOSIS — Z79.818 ANDROGEN DEPRIVATION THERAPY: ICD-10-CM

## 2024-10-28 DIAGNOSIS — Z92.3 STATUS POST RADIATION THERAPY WITHIN FOUR TO TWELVE WEEKS: ICD-10-CM

## 2024-10-28 DIAGNOSIS — C61 PROSTATE CANCER: ICD-10-CM

## 2024-10-28 DIAGNOSIS — Z08 ENCOUNTER FOR FOLLOW-UP EXAMINATION AFTER COMPLETED TREATMENT FOR MALIGNANT NEOPLASM: ICD-10-CM

## 2024-10-28 LAB — PSA SERPL-MCNC: 0.02 NG/ML (ref 0–4)

## 2024-10-28 PROCEDURE — 84153 ASSAY OF PSA TOTAL: CPT | Performed by: NURSE PRACTITIONER

## 2024-10-28 PROCEDURE — 36415 COLL VENOUS BLD VENIPUNCTURE: CPT | Performed by: NURSE PRACTITIONER

## 2024-10-28 PROCEDURE — G0463 HOSPITAL OUTPT CLINIC VISIT: HCPCS | Performed by: NURSE PRACTITIONER

## 2024-10-28 NOTE — PROGRESS NOTES
Follow Up Office Visit      Encounter Date: 10/28/2024   Patient Name: Kian Wakefield  YOB: 1945   Medical Record Number: [id number]   Primary Diagnosis: Malignant neoplasm of prostate [C61]     Cancer Staging   No matching staging information was found for the patient.    Radiation Completion Date:  9/16/2024     Chief Complaint:  No chief complaint on file.      Oncology/Hematology History   Malignant neoplasm of prostate   5/9/2024 Cancer Staged    Staging form: Prostate, AJCC 8th Edition  - Clinical stage from 5/9/2024: Stage IIC (cT1c, cN0, cM0, PSA: 10, Grade Group: 3) - Signed by Mary Kate Jacobs APRN on 10/28/2024     7/16/2024 Procedure    Hydrogel spacer placement with fiducial marker placement for radiotherapy with Dr. Huston.      7/22/2024 - 10/18/2024 Hormonal Therapy    Completed 6 months of ADT with Lupron.      7/29/2024 Initial Diagnosis    Malignant neoplasm of prostate     8/7/2024 - 9/16/2024 Radiation    Radiation OncologyTreatment Course:  Kian Wakefield received 7000 cGy in 28 fractions to the prostate and seminal vesicles.          History of Present Illness: Kian Wakefield is a 79 y.o. male who returns to [facility] for short interval follow-up after completing external beam radiotherapy on 9/16/24. He presents today with his wife. He reports feeling well overall with no treatment related concerns. He has no urinary complaints today denying dysuria, hematuria, weak stream, incomplete emptying or incontinence. Nocturia 2x/night which predates radiotherapy. He is taking Flomax prescribed by Dr. Avila. He denies change in bowel function. He received his final Lupron injection on 10/18/24. Has associated hot flashes.     Subjective      Review of Systems: Review of Systems   Constitutional:  Positive for fatigue (2/10). Negative for activity change, appetite change, chills, fever and unexpected weight change.   HENT:  Negative for sore throat and tinnitus.     Respiratory:  Negative for cough and shortness of breath.    Cardiovascular:  Positive for leg swelling (Lower legs and ankles, left slightly worse than right.  Trace to +1 pitting, noted x1 week.).   Gastrointestinal:  Negative for abdominal distention, abdominal pain, anal bleeding, blood in stool, constipation, diarrhea, nausea, rectal pain and vomiting.   Endocrine: Positive for heat intolerance (Multiple/day).   Genitourinary:  Positive for frequency (Ongoing) and urgency (Ongoing). Negative for decreased urine volume, difficulty urinating, dysuria and hematuria.        Noc x2  Normal stream  No leakage   Musculoskeletal:  Positive for back pain (Ongoing) and joint swelling (Noted in ankles x1 week, trace to +1 pitting.). Negative for arthralgias and neck pain.   Neurological:  Negative for dizziness and headaches.   Psychiatric/Behavioral:  Positive for sleep disturbance (Noc x2).        The following portions of the patient's history were reviewed and updated as appropriate: allergies, current medications, past family history, past medical history, past social history, past surgical history and problem list.    Medications:     Current Outpatient Medications:     amLODIPine (NORVASC) 5 MG tablet, Take 1 tablet by mouth Daily., Disp: , Rfl:     aspirin 81 MG EC tablet, Take 1 tablet by mouth Daily., Disp: , Rfl:     cyanocobalamin (VITAMIN B-12) 1000 MCG tablet, Take 1 tablet by mouth Daily., Disp: , Rfl:     ezetimibe (ZETIA) 10 MG tablet, Take 1 tablet by mouth Daily., Disp: , Rfl:     metoprolol tartrate (LOPRESSOR) 25 MG tablet, 2 (Two) Times a Day. 1/2 tablet daily, Disp: , Rfl:     rosuvastatin (CRESTOR) 40 MG tablet, Take 1 tablet by mouth Every Night., Disp: , Rfl:     tamsulosin (FLOMAX) 0.4 MG capsule 24 hr capsule, Take 1 capsule by mouth Daily., Disp: 30 capsule, Rfl: 11    Zinc 50 MG tablet, Take 1 tablet by mouth Daily., Disp: , Rfl:     Allergies:   Allergies   Allergen Reactions     Levofloxacin Swelling       Patient Smoking History:   Social History     Tobacco Use   Smoking Status Never    Passive exposure: Never   Smokeless Tobacco Never       Measures:  PHQ-9 Total Score: 0   Quality of Life: 100 - Full Activity   ECOG score: 0  ECOG: (0) Fully active, able to carry on all predisease performance without restriction  Pain: (on a scale of 0-10)   Pain Score    10/28/24 1104   PainSc: 0-No pain         Objective     Physical Exam:   Vital Signs:   Vitals:    10/28/24 1104   BP: 158/77   Pulse: 53   Resp: 16   Temp: 97.8 °F (36.6 °C)   TempSrc: Temporal   SpO2: 98%   Weight: 83.7 kg (184 lb 8.4 oz)   PainSc: 0-No pain     Body mass index is 29.78 kg/m².   Wt Readings from Last 3 Encounters:   10/28/24 83.7 kg (184 lb 8.4 oz)   10/18/24 81.6 kg (180 lb)   09/10/24 81.8 kg (180 lb 5.4 oz)       Physical Exam  Vitals reviewed.   Constitutional:       General: He is not in acute distress.     Appearance: Normal appearance. He is normal weight. He is not ill-appearing.   HENT:      Head: Normocephalic and atraumatic.      Mouth/Throat:      Mouth: Mucous membranes are moist.      Pharynx: Oropharynx is clear. No oropharyngeal exudate or posterior oropharyngeal erythema.   Eyes:      Conjunctiva/sclera: Conjunctivae normal.      Pupils: Pupils are equal, round, and reactive to light.   Cardiovascular:      Rate and Rhythm: Normal rate and regular rhythm.      Pulses: Normal pulses.      Heart sounds: Normal heart sounds.   Pulmonary:      Effort: Pulmonary effort is normal. No respiratory distress.      Breath sounds: Normal breath sounds.   Musculoskeletal:         General: Normal range of motion.      Cervical back: Normal range of motion.   Skin:     General: Skin is warm and dry.   Neurological:      General: No focal deficit present.      Mental Status: He is alert and oriented to person, place, and time. Mental status is at baseline.   Psychiatric:         Mood and Affect: Mood normal.          Behavior: Behavior normal.       Result Review: I independently reviewed the following data.   -- PSA DIAGNOSTIC (10/28/2024 11:10)   -- Tissue Pathology Exam (05/09/2024 12:41)     Pathology:   Lab Results   Component Value Date    CLININFO  05/09/2024     Elevated prostate specific antigen (PSA)      FINALDX  05/09/2024     1. Prostate gland, left base, needle core biopsy:   - Benign prostatic tissue       2. Prostate gland, left mid, needle core biopsy:   - Benign prostatic tissue       3. Prostate gland, left apex, needle core biopsy:   - Adenocarcinoma, Grade Group 3 ([place] grade 4+3 = score of 7), in 2 of 2 cores, involving 18% of needle core tissue, and measuring 2 mm in length       4. Prostate gland, right base, needle core biopsy:   - Benign prostatic tissue       5. Prostate gland, right mid, needle core biopsy:   - Benign prostatic tissue       6. Prostate gland, right apex, needle core biopsy:   - Adenocarcinoma, Grade Group 1 ([place] grade 3+3 = score of 6), in 1 of 2 cores, involving 3% of needle core tissue, and measuring 1 mm in length       7. Prostate gland, region of interest, needle core biopsy:   - Adenocarcinoma, Grade Group 1 ([place] grade 3+3 = score of 6), in 1 of 2 cores, involving 5% of needle core tissue, and measuring 1 mm in length    REMARKS: The above positive (malignant) diagnosis was called to Emily JOENS in Dr. DWAINE Fortune's office at 14:28 EDT on 5/13/2024 by Geronimo ISIDRO.          Imaging: No radiology results for the last 90 days.     Labs:   WBC   Date Value Ref Range Status   03/01/2024 7.19 3.40 - 10.80 10*3/mm3 Final     Hemoglobin   Date Value Ref Range Status   03/01/2024 15.3 13.0 - 17.7 g/dL Final     Hematocrit   Date Value Ref Range Status   03/01/2024 46.9 37.5 - 51.0 % Final     Platelets   Date Value Ref Range Status   03/01/2024 318 140 - 450 10*3/mm3 Final     Creatinine   Date Value Ref Range Status   07/29/2024 0.80 0.60 - 1.30 mg/dL Final     Comment:     Serial  Number: [id number]:  [id number]     BUN   Date Value Ref Range Status   03/01/2024 10 8 - 23 mg/dL Final     eGFR   Date Value Ref Range Status   07/29/2024 91.7 >60.0 mL/min/1.73 Final      PSA   Date Value Ref Range Status   10/28/2024 0.018 0.000 - 4.000 ng/mL Final   03/01/2024 10.000 (H) 0.000 - 4.000 ng/mL Final   05/02/2022 4.790 (H) 0.000 - 4.000 ng/mL Final     Assessment / Plan      Impression: Kian Wakefield is a pleasant 79 y.o. male with cT1c cN0 cM) adenocarcinoma of the prostate, grade 3, [place] 4+3= 7, iPSA 10 ng/mL. He is status post external beam radiotherapy to the prostate and seminal vesicles, completed on 9/16/2024. Received 7000 cGy in 28 fractions. He is clinically doing well overall with essentially unchanged urinary function on Flomax. AUA Symptom Score today of 4. Awaiting results of PSA obtained today. He has completed 6 months of ADT with Lupron; received final Lupron 22.5 injection on 10/18/24.     Assessment/Plan:   Diagnoses and all orders for this visit:    1. Malignant neoplasm of prostate (Primary)    2. Status post radiation therapy within four to twelve weeks    3. Encounter for follow-up examination after completed treatment for malignant neoplasm    4. Prostate cancer  -     PSA DIAGNOSTIC    5. Androgen deprivation therapy       Follow Up:   Awaiting results of PSA obtained today. Will contact him after review.  Return for follow-up in 6 months with PSA prior.   Follow-up with Dr. Avila on 2/18/25.    ADDENDUM on 10/29/24:  his PSA on 10/28/24 is 0.018 ng/mL, indicating a good treatment response.     Return in about 6 months (around 4/28/2025) for Office Visit.  Kian Wakefield was encouraged to contact me in the interim with any questions or concerns regarding his care.      TIM Red  Radiation Oncology  [facility]    This document has been signed by TIM Patel on October 29, 2024 09:37 EDT

## 2024-11-21 ENCOUNTER — TELEPHONE (OUTPATIENT)
Dept: FAMILY MEDICINE CLINIC | Age: 79
End: 2024-11-21
Payer: MEDICARE

## 2024-11-21 ENCOUNTER — CLINICAL SUPPORT (OUTPATIENT)
Dept: FAMILY MEDICINE CLINIC | Age: 79
End: 2024-11-21
Payer: MEDICARE

## 2024-11-21 VITALS — SYSTOLIC BLOOD PRESSURE: 142 MMHG | HEART RATE: 61 BPM | DIASTOLIC BLOOD PRESSURE: 70 MMHG

## 2024-11-21 NOTE — TELEPHONE ENCOUNTER
Spoke with pt in regards to elevated BP reading on 10/28/24. Pt is okay with coming into the office for a free bp recheck, will be in this week to complete.

## 2024-11-22 ENCOUNTER — TELEPHONE (OUTPATIENT)
Dept: FAMILY MEDICINE CLINIC | Age: 79
End: 2024-11-22
Payer: MEDICARE

## 2024-11-22 NOTE — TELEPHONE ENCOUNTER
----- Message from Raissa Patiño sent at 11/21/2024  4:45 PM EST -----  BP in acceptable range  no changes recommended  ----- Message -----  From: Mary Alice Chance LPN  Sent: 11/21/2024   4:03 PM EST  To: TIM Schmidt    B/P check

## 2025-01-20 ENCOUNTER — HOSPITAL ENCOUNTER (OUTPATIENT)
Dept: GENERAL RADIOLOGY | Facility: HOSPITAL | Age: 80
Discharge: HOME | End: 2025-01-20
Payer: MEDICARE

## 2025-01-20 ENCOUNTER — OFFICE VISIT (OUTPATIENT)
Dept: FAMILY MEDICINE CLINIC | Age: 80
End: 2025-01-20
Payer: MEDICARE

## 2025-01-20 ENCOUNTER — LAB (OUTPATIENT)
Dept: LAB | Facility: HOSPITAL | Age: 80
End: 2025-01-20
Payer: MEDICARE

## 2025-01-20 VITALS
OXYGEN SATURATION: 95 % | BODY MASS INDEX: 29.73 KG/M2 | DIASTOLIC BLOOD PRESSURE: 69 MMHG | WEIGHT: 185 LBS | TEMPERATURE: 98.1 F | HEIGHT: 66 IN | HEART RATE: 63 BPM | SYSTOLIC BLOOD PRESSURE: 156 MMHG

## 2025-01-20 DIAGNOSIS — M79.641 PAIN OF RIGHT HAND: ICD-10-CM

## 2025-01-20 DIAGNOSIS — R01.1 MURMUR, CARDIAC: ICD-10-CM

## 2025-01-20 DIAGNOSIS — R20.2 NUMBNESS AND TINGLING OF BOTH LEGS: ICD-10-CM

## 2025-01-20 DIAGNOSIS — Z13.6 SCREENING FOR CARDIOVASCULAR CONDITION: ICD-10-CM

## 2025-01-20 DIAGNOSIS — R60.0 LOCALIZED EDEMA: ICD-10-CM

## 2025-01-20 DIAGNOSIS — R20.0 NUMBNESS AND TINGLING OF BOTH LEGS: ICD-10-CM

## 2025-01-20 DIAGNOSIS — M79.641 PAIN OF RIGHT HAND: Primary | ICD-10-CM

## 2025-01-20 DIAGNOSIS — R06.00 DYSPNEA, UNSPECIFIED TYPE: ICD-10-CM

## 2025-01-20 LAB
ALBUMIN SERPL-MCNC: 4.5 G/DL (ref 3.5–5.2)
ALBUMIN/GLOB SERPL: 1.6 G/DL
ALP SERPL-CCNC: 79 U/L (ref 39–117)
ALT SERPL W P-5'-P-CCNC: 16 U/L (ref 1–41)
ANION GAP SERPL CALCULATED.3IONS-SCNC: 11 MMOL/L (ref 5–15)
AST SERPL-CCNC: 18 U/L (ref 1–40)
BILIRUB SERPL-MCNC: 0.5 MG/DL (ref 0–1.2)
BUN SERPL-MCNC: 11 MG/DL (ref 8–23)
BUN/CREAT SERPL: 13.8 (ref 7–25)
CALCIUM SPEC-SCNC: 9.7 MG/DL (ref 8.6–10.5)
CHLORIDE SERPL-SCNC: 100 MMOL/L (ref 98–107)
CHOLEST SERPL-MCNC: 169 MG/DL (ref 0–200)
CO2 SERPL-SCNC: 28 MMOL/L (ref 22–29)
CREAT SERPL-MCNC: 0.8 MG/DL (ref 0.76–1.27)
EGFRCR SERPLBLD CKD-EPI 2021: 91.2 ML/MIN/1.73
GLOBULIN UR ELPH-MCNC: 2.8 GM/DL
GLUCOSE SERPL-MCNC: 87 MG/DL (ref 65–99)
HDLC SERPL-MCNC: 77 MG/DL (ref 40–60)
LDLC SERPL CALC-MCNC: 73 MG/DL (ref 0–100)
LDLC/HDLC SERPL: 0.91 {RATIO}
NT-PROBNP SERPL-MCNC: 191 PG/ML (ref 0–1800)
POTASSIUM SERPL-SCNC: 4.6 MMOL/L (ref 3.5–5.2)
PROT SERPL-MCNC: 7.3 G/DL (ref 6–8.5)
SODIUM SERPL-SCNC: 139 MMOL/L (ref 136–145)
TRIGL SERPL-MCNC: 109 MG/DL (ref 0–150)
TSH SERPL DL<=0.05 MIU/L-ACNC: 2.88 UIU/ML (ref 0.27–4.2)
VLDLC SERPL-MCNC: 19 MG/DL (ref 5–40)

## 2025-01-20 PROCEDURE — 82607 VITAMIN B-12: CPT

## 2025-01-20 PROCEDURE — 83880 ASSAY OF NATRIURETIC PEPTIDE: CPT

## 2025-01-20 PROCEDURE — 84443 ASSAY THYROID STIM HORMONE: CPT

## 2025-01-20 PROCEDURE — 80061 LIPID PANEL: CPT

## 2025-01-20 PROCEDURE — 99214 OFFICE O/P EST MOD 30 MIN: CPT | Performed by: NURSE PRACTITIONER

## 2025-01-20 PROCEDURE — 36415 COLL VENOUS BLD VENIPUNCTURE: CPT

## 2025-01-20 PROCEDURE — 3077F SYST BP >= 140 MM HG: CPT | Performed by: NURSE PRACTITIONER

## 2025-01-20 PROCEDURE — 3078F DIAST BP <80 MM HG: CPT | Performed by: NURSE PRACTITIONER

## 2025-01-20 PROCEDURE — 80053 COMPREHEN METABOLIC PANEL: CPT

## 2025-01-20 PROCEDURE — 1126F AMNT PAIN NOTED NONE PRSNT: CPT | Performed by: NURSE PRACTITIONER

## 2025-01-20 PROCEDURE — G2211 COMPLEX E/M VISIT ADD ON: HCPCS | Performed by: NURSE PRACTITIONER

## 2025-01-20 PROCEDURE — 73130 X-RAY EXAM OF HAND: CPT

## 2025-01-20 NOTE — PROGRESS NOTES
Chief Complaint  Kian Wakefield presents to [facility] for Edema (Bilateral feet x 2 months ) and Hand Pain ((R) middle finger, pt states injured x 4 years ago with a hammer, he is having pain going up his arm at times )    Subjective     History of Present Illness  Miles is in today with concerns over swelling in bilateral legs for 2 months.  It goes up into his upper part of his lower legs just below his knees.  He does report that the swelling will go down with elevation but he is having quite a bit of pain.  He is not able to put on the compression socks due to the discomfort.  He denies any significant shortness of breath but does have some with exertion.  He reports that there is some numbness and tingling in his legs but this is kind of been intermittent for years.  He does have a known murmur (unsure of when his last evaluation) and is managed by cardiology - Dr. Poe at [facility] .  Cardiac history of s/p stenting of RCA, HTN, HLD.  He follows up there annually  He is also having pain in his right hand-middle finger.  He states that he has been having some throbbing and discomfort that is not really relieved with what he generally would relieve with as far as topical anti-inflammatories and Tylenol.  He does have some intermittent swelling but nothing significant.  He did have an injury years ago where he smashed his finger and really never went and had it evaluated but he is wanting to have this looked at at this time.    Assessment and Plan     Diagnoses and all orders for this visit:    1. Pain of right hand (Primary)  Comments:  xray today  consider additional testing, advised tylenol, topical and possible referral  Orders:  -     XR Hand 3+ View Right; Future    2. Localized edema  Comments:  will check labs  advised to follow up with his cardiologist based on testing.  He does have a murmure which he states he was aware of but consider echo  Orders:  -      "BNP; Future  -     Comprehensive metabolic panel; Future  -     TSH Rfx On Abnormal To Free T4; Future    3. Numbness and tingling of both legs  Comments:  check labs  consider additional work up if cardiac standpoint stable  Orders:  -     Vitamin B12; Future    4. Screening for cardiovascular condition  -     Lipid panel; Future    5. Murmur, cardiac  Comments:  consider echo if not completed recently by cardiology  Orders:  -     BNP; Future    6. Dyspnea, unspecified type  Comments:  will check BNP/ consider additional work up if cardiac appears stable  Orders:  -     BNP; Future            Follow Up   Return for Pending lab results.  Future Appointments   Date Time Provider Department Center   2/18/2025  8:45 AM Levi Avila MD [facility] U ETRING [facility]   3/3/2025  8:15 AM Raissa Patiño APRN [facility] PC BARDS [facility]   4/28/2025  8:00 AM Mary Kate Jacobs APRN [facility] RO [facility]       No orders of the defined types were placed in this encounter.      Medications Discontinued During This Encounter   Medication Reason    tamsulosin (FLOMAX) 0.4 MG capsule 24 hr capsule *Therapy completed          Review of Systems    Objective     Vitals:    01/20/25 1539   BP: 156/69   BP Location: Right arm   Patient Position: Sitting   Cuff Size: Adult   Pulse: 63   Temp: 98.1 °F (36.7 °C)   TempSrc: Oral   SpO2: 95%   Weight: 83.9 kg (185 lb)   Height: 167.6 cm (66\")            Physical Exam  Vitals reviewed.   Constitutional:       Appearance: Normal appearance.   HENT:      Head: Normocephalic.   Eyes:      Pupils: Pupils are equal, round, and reactive to light.   Cardiovascular:      Rate and Rhythm: Normal rate and regular rhythm.      Heart sounds: Murmur heard.   Pulmonary:      Effort: Pulmonary effort is normal.      Breath sounds: Normal breath sounds.   Musculoskeletal:         General: Normal range of motion.      Right lower leg: 3+ Edema present.      Left lower leg: 3+ Edema present.   Neurological:      Mental Status: He is " alert.   Psychiatric:         Mood and Affect: Mood normal.         Behavior: Behavior normal.               Result Review          Allergies   Allergen Reactions    Levofloxacin Swelling      Past Medical History:   Diagnosis Date    Cancer     prostate    Hyperlipidemia     Hypertension     Malaria 1969    Myocardial infarction 2003    2 stents placement     Current Outpatient Medications   Medication Sig Dispense Refill    amLODIPine (NORVASC) 5 MG tablet Take 1 tablet by mouth Daily.      aspirin 81 MG EC tablet Take 1 tablet by mouth Daily.      cyanocobalamin (VITAMIN B-12) 1000 MCG tablet Take 1 tablet by mouth Daily.      ezetimibe (ZETIA) 10 MG tablet Take 1 tablet by mouth Daily.      metoprolol tartrate (LOPRESSOR) 25 MG tablet 2 (Two) Times a Day. 1/2 tablet daily      rosuvastatin (CRESTOR) 40 MG tablet Take 1 tablet by mouth Every Night.      Zinc 50 MG tablet Take 1 tablet by mouth Daily.      furosemide (Lasix) 20 MG tablet Take 1 tablet by mouth 2 (Two) Times a Day for 5 days. 10 tablet 0     No current facility-administered medications for this visit.     Past Surgical History:   Procedure Laterality Date    CARDIAC CATHETERIZATION      COLONOSCOPY      CORONARY ANGIOPLASTY WITH STENT PLACEMENT      PROSTATE BIOPSY N/A 05/09/2024    Procedure: PROSTATE ULTRASOUND BIOPSY MRI FUSION WITH URONAV;  Surgeon: Levi Avila MD;  Location: [facility];  Service: Urology;  Laterality: N/A;      Health Maintenance Due   Topic Date Due    INFLUENZA VACCINE  Never done    COVID-19 Vaccine (7 - 2024-25 season) 09/01/2024    ANNUAL WELLNESS VISIT  03/01/2025      Immunization History   Administered Date(s) Administered    COVID-19 (MODERNA) 1st,2nd,3rd Dose Monovalent 02/04/2021, 03/04/2021, 11/03/2021, 06/09/2022    COVID-19 (MODERNA) BIVALENT 12+YRS 12/01/2022    COVID-19 (PFIZER) 12YRS+ (COMIRNATY) 03/01/2024    Pneumococcal Conjugate 13-Valent (PCV13) 02/22/2022    Pneumococcal Conjugate 20-Valent  (PCV20) 02/28/2023    Tdap 02/22/2021         Part of this note may be an electronic transcription/translation of spoken language to printed   text using the Dragon Dictation System.      TIM Schmidt

## 2025-01-21 LAB — VIT B12 BLD-MCNC: 622 PG/ML (ref 211–946)

## 2025-01-22 ENCOUNTER — TELEPHONE (OUTPATIENT)
Dept: FAMILY MEDICINE CLINIC | Age: 80
End: 2025-01-22
Payer: MEDICARE

## 2025-01-22 NOTE — TELEPHONE ENCOUNTER
"  Caller: Kian Wakefield \"Miles\"    Relationship: Self    Best call back number: [phone]     What is the best time to reach you: ANYTIME     Who are you requesting to speak with (clinical staff, provider,  specific staff member): CLINICAL     What was the call regarding: PATIENT IS CALLING TO TOUCH BASE OF LAST OFFICE VISIT, STATED PCP WOULD GET WITH HIM ON BEHALF OF VISIT, AND SWELLING IN FEET.   "

## 2025-01-23 DIAGNOSIS — R60.0 LOCALIZED EDEMA: Primary | ICD-10-CM

## 2025-01-23 RX ORDER — FUROSEMIDE 20 MG/1
20 TABLET ORAL 2 TIMES DAILY
Qty: 10 TABLET | Refills: 0 | Status: SHIPPED | OUTPATIENT
Start: 2025-01-23 | End: 2025-01-28

## 2025-02-10 ENCOUNTER — LAB (OUTPATIENT)
Dept: LAB | Facility: HOSPITAL | Age: 80
End: 2025-02-10
Payer: MEDICARE

## 2025-02-10 DIAGNOSIS — C61 MALIGNANT NEOPLASM OF PROSTATE: ICD-10-CM

## 2025-02-10 LAB — PSA SERPL-MCNC: <0.014 NG/ML (ref 0–4)

## 2025-02-10 PROCEDURE — 36415 COLL VENOUS BLD VENIPUNCTURE: CPT

## 2025-02-10 PROCEDURE — 84153 ASSAY OF PSA TOTAL: CPT

## 2025-02-15 PROBLEM — C61 PROSTATE CANCER: Status: ACTIVE | Noted: 2025-02-15

## 2025-02-15 NOTE — PROGRESS NOTES
Chief Complaint: Urologic complaint    Subjective         History of Present Illness  Kian Wakefield is a 79 y.o. male           Prostatic adenocarcinoma clinical T1c      Still with hot flashes just a little better.      off tamsulosin 0.4 mg daily   For the last 2 months, cannot really tell any difference  No incontinence.  Mild urgency    Good stream    No GH    Has had some symmetric bilateral lower extremity edema that his PCP and cardiology is working       MI, 2 coronary stents aspirin 81.      No family HX of  malignancies.       Previous    Followed by cardiology  No pulmonary issues,  No DM    Can still get erections,    worried about this, uses tadalafil 2.5 mg as needed     No previous abdominal surgeries.    Brother is in his 80s, father  in his 50s of MI.     PVR         027          Prostate CA            <0.014  10/24     0.018  -finished Lupron     XRT - Dr. Huston    2024 first Lupron 22.5    2024 MRI bx  Left apex - 4+3, 2/2, 18%  Right apex - 3+3, 1/2, 3%  Region of interest-3+3, 1/2, 5%    2024 MRI prostate -37 g - PSAd 0.27  PI-RADS 4 - 9 mm lesion right anterior peripheral zone, prostatic apex.    3/24   10.0  3/22     4.7      Objective     Past Medical History:   Diagnosis Date    Cancer     prostate    Hyperlipidemia     Hypertension     Malaria 1969    Myocardial infarction     2 stents placement                         Assessment and Plan    Diagnoses and all orders for this visit:    1. Prostate cancer (Primary)          Stop Flomax, not needing this any further doing okay currently      PSA undetectable, patient given reassurance    Follow-up in 6 months with PSA with NP

## 2025-02-18 ENCOUNTER — OFFICE VISIT (OUTPATIENT)
Dept: UROLOGY | Age: 80
End: 2025-02-18
Payer: MEDICARE

## 2025-02-18 VITALS — WEIGHT: 185 LBS | HEIGHT: 66 IN | BODY MASS INDEX: 29.73 KG/M2

## 2025-02-18 DIAGNOSIS — C61 PROSTATE CANCER: Primary | ICD-10-CM

## 2025-02-18 RX ORDER — TAMSULOSIN HYDROCHLORIDE 0.4 MG/1
1 CAPSULE ORAL DAILY
COMMUNITY
Start: 2025-01-20

## 2025-03-03 ENCOUNTER — OFFICE VISIT (OUTPATIENT)
Dept: FAMILY MEDICINE CLINIC | Age: 80
End: 2025-03-03
Payer: MEDICARE

## 2025-03-03 VITALS
DIASTOLIC BLOOD PRESSURE: 71 MMHG | OXYGEN SATURATION: 98 % | TEMPERATURE: 97.5 F | BODY MASS INDEX: 30.18 KG/M2 | WEIGHT: 187.8 LBS | HEIGHT: 66 IN | SYSTOLIC BLOOD PRESSURE: 135 MMHG | HEART RATE: 58 BPM

## 2025-03-03 DIAGNOSIS — Z00.00 MEDICARE ANNUAL WELLNESS VISIT, SUBSEQUENT: Primary | ICD-10-CM

## 2025-03-03 PROCEDURE — 1126F AMNT PAIN NOTED NONE PRSNT: CPT | Performed by: NURSE PRACTITIONER

## 2025-03-03 PROCEDURE — G0439 PPPS, SUBSEQ VISIT: HCPCS | Performed by: NURSE PRACTITIONER

## 2025-03-03 PROCEDURE — 1170F FXNL STATUS ASSESSED: CPT | Performed by: NURSE PRACTITIONER

## 2025-03-03 PROCEDURE — 3078F DIAST BP <80 MM HG: CPT | Performed by: NURSE PRACTITIONER

## 2025-03-03 PROCEDURE — 3075F SYST BP GE 130 - 139MM HG: CPT | Performed by: NURSE PRACTITIONER

## 2025-03-03 PROCEDURE — 1160F RVW MEDS BY RX/DR IN RCRD: CPT | Performed by: NURSE PRACTITIONER

## 2025-03-03 PROCEDURE — 1159F MED LIST DOCD IN RCRD: CPT | Performed by: NURSE PRACTITIONER

## 2025-03-03 RX ORDER — LOSARTAN POTASSIUM AND HYDROCHLOROTHIAZIDE 25; 100 MG/1; MG/1
1 TABLET ORAL DAILY
COMMUNITY
Start: 2025-02-24 | End: 2025-05-25

## 2025-03-03 NOTE — PROGRESS NOTES
Subjective   The ABCs of the Annual Wellness Visit  Medicare Wellness Visit      Kian Wakefield is a 79 y.o. patient who presents for a Medicare Wellness Visit.    The following portions of the patient's history were reviewed and   updated as appropriate: allergies, current medications, past family history, past medical history, past social history, past surgical history, and problem list.    Compared to one year ago, the patient's physical   health is the same.  Compared to one year ago, the patient's mental   health is better.    Recent Hospitalizations:  He was not admitted to the hospital during the last year.     Current Medical Providers:  Patient Care Team:  Raissa Patiño APRN as PCP - General (Nurse Practitioner)  Oral Poe MD as Consulting Physician (Cardiology)  Jemma Herndon OD (Optometry)    Outpatient Medications Prior to Visit   Medication Sig Dispense Refill    aspirin 81 MG EC tablet Take 1 tablet by mouth Daily.      cyanocobalamin (VITAMIN B-12) 1000 MCG tablet Take 1 tablet by mouth Daily.      ezetimibe (ZETIA) 10 MG tablet Take 1 tablet by mouth Daily.      losartan-hydrochlorothiazide (HYZAAR) 100-25 MG per tablet Take 1 tablet by mouth Daily.      metoprolol tartrate (LOPRESSOR) 25 MG tablet 2 (Two) Times a Day. 1/2 tablet daily      rosuvastatin (CRESTOR) 40 MG tablet Take 1 tablet by mouth Every Night.      tamsulosin (FLOMAX) 0.4 MG capsule 24 hr capsule Take 1 capsule by mouth Daily.      Zinc 50 MG tablet Take 1 tablet by mouth Daily.      amLODIPine (NORVASC) 5 MG tablet Take 1 tablet by mouth Daily. (Patient not taking: Reported on 3/3/2025)      furosemide (Lasix) 20 MG tablet Take 1 tablet by mouth 2 (Two) Times a Day for 5 days. (Patient not taking: Reported on 3/3/2025) 10 tablet 0     No facility-administered medications prior to visit.     No opioid medication identified on active medication list. I have reviewed chart for other potential  high risk medication/s  "and harmful drug interactions in the elderly.      Aspirin is on active medication list. Aspirin use is indicated based on review of current medical condition/s. Pros and cons of this therapy have been discussed today. Benefits of this medication outweigh potential harm.  Patient has been encouraged to continue taking this medication.  .      Patient Active Problem List   Diagnosis    Atherosclerotic heart disease of native coronary artery without angina pectoris    Arteriosclerosis of coronary artery    Hyperlipidemia    Hypercholesterolemia    Hypertension    Myocardial infarction    Seasonal allergic rhinitis    Stented coronary artery    Elevated prostate specific antigen (PSA)    Malignant neoplasm of prostate    Prostate cancer     Advance Care Planning Advance Directive is not on file.  ACP discussion was held with the patient during this visit. Patient does not have an advance directive, declines further assistance.            Objective   Vitals:    03/03/25 0811   BP: 135/71   BP Location: Right arm   Patient Position: Sitting   Cuff Size: Adult   Pulse: 58   Temp: 97.5 °F (36.4 °C)   TempSrc: Temporal   SpO2: 98%   Weight: 85.2 kg (187 lb 12.8 oz)   Height: 167.6 cm (65.98\")   PainSc: 0-No pain       Estimated body mass index is 30.33 kg/m² as calculated from the following:    Height as of this encounter: 167.6 cm (65.98\").    Weight as of this encounter: 85.2 kg (187 lb 12.8 oz).    BMI is >= 30 and <35. (Class 1 Obesity). The following options were offered after discussion;: exercise counseling/recommendations and nutrition counseling/recommendations  continue to walk daily as tolerated           Does the patient have evidence of cognitive impairment? No  Lab Results   Component Value Date    TRIG 109 01/20/2025    HDL 77 (H) 01/20/2025    LDL 73 01/20/2025    VLDL 19 01/20/2025                                                                                               Health  Risk " Assessment    Smoking Status:  Social History     Tobacco Use   Smoking Status Never    Passive exposure: Never   Smokeless Tobacco Never     Alcohol Consumption:  Social History     Substance and Sexual Activity   Alcohol Use Yes    Comment: jonesey nightly   - 2 drinks per night       Fall Risk Screen  STEADI Fall Risk Assessment was completed, and patient is at LOW risk for falls.Assessment completed on:3/3/2025    Depression Screening   Little interest or pleasure in doing things? Not at all   Feeling down, depressed, or hopeless? Not at all   PHQ-2 Total Score 0      Health Habits and Functional and Cognitive Screening:      3/3/2025     8:24 AM   Functional & Cognitive Status   Do you have difficulty preparing food and eating? No   Do you have difficulty bathing yourself, getting dressed or grooming yourself? No   Do you have difficulty using the toilet? No   Do you have difficulty moving around from place to place? No   Do you have trouble with steps or getting out of a bed or a chair? No   Current Diet Well Balanced Diet   Dental Exam Not up to date   Eye Exam Not up to date   Exercise (times per week) 0 times per week   Current Exercises Include No Regular Exercise   Do you need help using the phone?  No   Are you deaf or do you have serious difficulty hearing?  No   Do you need help to go to places out of walking distance? No   Do you need help shopping? No   Do you need help preparing meals?  No   Do you need help with housework?  No   Do you need help with laundry? No   Do you need help taking your medications? No   Do you need help managing money? No   Do you ever drive or ride in a car without wearing a seat belt? No   Have you felt unusual stress, anger or loneliness in the last month? No   Who do you live with? Spouse   If you need help, do you have trouble finding someone available to you? No   Have you been bothered in the last four weeks by sexual problems? No   Do you have difficulty  concentrating, remembering or making decisions? No           Age-appropriate Screening Schedule:  Refer to the list below for future screening recommendations based on patient's age, sex and/or medical conditions. Orders for these recommended tests are listed in the plan section. The patient has been provided with a written plan.    Health Maintenance List  Health Maintenance   Topic Date Due    BMI FOLLOWUP  01/29/2025    ZOSTER VACCINE (1 of 2) 03/03/2025 (Originally 10/15/1964)    COVID-19 Vaccine (7 - 2024-25 season) 03/05/2025 (Originally 9/1/2024)    INFLUENZA VACCINE  03/31/2025 (Originally 7/1/2024)    RSV Vaccine - Adults (1 - 1-dose 75+ series) 03/03/2026 (Originally 10/15/2022)    LIPID PANEL  01/20/2026    ANNUAL WELLNESS VISIT  03/03/2026    TDAP/TD VACCINES (2 - Td or Tdap) 02/22/2031    HEPATITIS C SCREENING  Completed    Pneumococcal Vaccine 50+  Completed    COLORECTAL CANCER SCREENING  Discontinued                                                                                                                                                CMS Preventative Services Quick Reference  Risk Factors Identified During Encounter  Immunizations Discussed/Encouraged: Tdap, Influenza, Shingrix, COVID19, and RSV (Respiratory Syncytial Virus)    The above risks/problems have been discussed with the patient.  Pertinent information has been shared with the patient in the After Visit Summary.  An After Visit Summary and PPPS were made available to the patient.    Follow Up:   Next Medicare Wellness visit to be scheduled in 1 year.       Miles continues to follow-up with cardiology for his history of MI from 2003 with 2 stents placed at that time.  He also has a history of hyperlipidemia and hypertension.  His cardiology office is Dr. Poe.  Recently he was taken off his amlodipine due to swelling in his lower extremities and changed over to losartan hydrochlorothiazide combo.  He reports that he is doing well  on this medication without any side effects and the swelling is resolving.  His current medication treatment is losartan/hydrochlorothiazide 100-25 daily, metoprolol 25 mg twice a day, rosuvastatin 40 mg nightly, Zetia 10 mg daily.    He also follows up with urology for history of prostate cancer.  He is following with radiation oncology TIM Patel and with urology Dr. Levi Avila.  Assessment & Plan  Medicare annual wellness visit, subsequent              Follow Up:   Return in about 1 year (around 3/3/2026) for Medicare Wellness.

## 2025-04-23 ENCOUNTER — LAB (OUTPATIENT)
Dept: LAB | Facility: HOSPITAL | Age: 80
End: 2025-04-23
Payer: MEDICARE

## 2025-04-23 ENCOUNTER — TELEPHONE (OUTPATIENT)
Dept: RADIATION ONCOLOGY | Facility: HOSPITAL | Age: 80
End: 2025-04-23
Payer: MEDICARE

## 2025-04-23 DIAGNOSIS — C61 MALIGNANT NEOPLASM OF PROSTATE: Primary | ICD-10-CM

## 2025-04-23 DIAGNOSIS — C61 MALIGNANT NEOPLASM OF PROSTATE: ICD-10-CM

## 2025-04-23 LAB — PSA SERPL-MCNC: <0.014 NG/ML (ref 0–4)

## 2025-04-23 PROCEDURE — 36415 COLL VENOUS BLD VENIPUNCTURE: CPT

## 2025-04-23 PROCEDURE — 84153 ASSAY OF PSA TOTAL: CPT

## 2025-04-23 NOTE — TELEPHONE ENCOUNTER
Called patient and LVM to have patient to call the office.  Patient is needing to have a PSA prior to his appt on 4/28/2025.

## 2025-04-25 NOTE — PROGRESS NOTES
Follow Up Office Visit      Encounter Date: 04/28/2025   Patient Name: Kian Wakefield  YOB: 1945   Medical Record Number: [id number]   Primary Diagnosis: Malignant neoplasm of prostate [C61]     Cancer Staging   Malignant neoplasm of prostate  Staging form: Prostate, AJCC 8th Edition  - Clinical stage from 5/9/2024: Stage IIC (cT1c, cN0, cM0, PSA: 10, Grade Group: 3) - Signed by Mary Kate Jacobs APRN on 10/28/2024    Radiation Completion Date:  9/16/2024    Chief Complaint:    Chief Complaint   Patient presents with    Follow-up    Prostate Cancer       Oncology/Hematology History   Malignant neoplasm of prostate   5/9/2024 Cancer Staged    Staging form: Prostate, AJCC 8th Edition  - Clinical stage from 5/9/2024: Stage IIC (cT1c, cN0, cM0, PSA: 10, Grade Group: 3) - Signed by Mary Kate Jacobs APRN on 10/28/2024     7/16/2024 Procedure    Hydrogel spacer placement with fiducial marker placement for radiotherapy with Dr. Huston.      7/22/2024 - 10/18/2024 Hormonal Therapy    Completed 6 months of ADT with Lupron.      7/29/2024 Initial Diagnosis    Malignant neoplasm of prostate     8/7/2024 - 9/16/2024 Radiation    Radiation OncologyTreatment Course:  Kian Wakefield received 7000 cGy in 28 fractions to the prostate and seminal vesicles.          History of Present Illness: Kian Wakefield is a 79 y.o. male who returns to [facility] for routine follow-up of his prostate cancer. He is accompanied by his wife.   History of Present Illness  Over the past 6 months, his condition has remained stable with no significant changes in urinary function. There is no dysuria or hematuria reported. Occasional urinary urgency is noted, but he can delay urination without difficulty. The urinary stream is consistent, and there is no trouble initiating urination. He feels that he fully empties his bladder and typically wakes up once or twice at night to urinate. No alterations in bowel function, including  constipation, diarrhea, or hematochezia, are reported. Flomax has been discontinued after consuming only one bottle. A consultation with Dr. Avila occurred on 02/18/2025, with a follow-up appointment scheduled for 08/2025.    Persistent hot flashes have been experienced, discussed with Dr. Avila and his cardiologist. Approximately 10 to 15 hot flashes occur daily, both during the day and night, often waking him up around 3:00 AM. He is currently on baby aspirin and not on any anticoagulants.     Severe leg swelling was reported, which improved after his cardiologist adjusted his blood pressure medication. The swelling was initially so severe that he was unable to remove his boots and had to resort to wearing work shoes. Compression socks are being used to manage the swelling.    Subjective      Review of Systems: Review of Systems   Constitutional:  Negative for activity change, appetite change, chills, fatigue, fever and unexpected weight change.   Gastrointestinal:  Negative for abdominal pain, blood in stool, constipation, diarrhea, nausea, rectal pain and vomiting.   Genitourinary:  Negative for difficulty urinating, dysuria, frequency, hematuria and urgency.        Normal urinary stream  Nocturia x2     Psychiatric/Behavioral:  Negative for sleep disturbance.        The following portions of the patient's history were reviewed and updated as appropriate: allergies, current medications, past family history, past medical history, past social history, past surgical history and problem list.    Medications:     Current Outpatient Medications:     aspirin 81 MG EC tablet, Take 1 tablet by mouth Daily., Disp: , Rfl:     cyanocobalamin (VITAMIN B-12) 1000 MCG tablet, Take 1 tablet by mouth Daily., Disp: , Rfl:     ezetimibe (ZETIA) 10 MG tablet, Take 1 tablet by mouth Daily., Disp: , Rfl:     losartan-hydrochlorothiazide (HYZAAR) 100-25 MG per tablet, Take 1 tablet by mouth Daily., Disp: , Rfl:     metoprolol  tartrate (LOPRESSOR) 25 MG tablet, 2 (Two) Times a Day. 1/2 tablet daily, Disp: , Rfl:     rosuvastatin (CRESTOR) 40 MG tablet, Take 1 tablet by mouth Every Night., Disp: , Rfl:     Zinc 50 MG tablet, Take 1 tablet by mouth Daily., Disp: , Rfl:     Allergies:   Allergies   Allergen Reactions    Levofloxacin Swelling       Patient Smoking History:   Social History     Tobacco Use   Smoking Status Never    Passive exposure: Never   Smokeless Tobacco Never       Measures:  PHQ-9 Total Score: 0   Quality of Life: 100 - Full Activity   ECOG score: 0  ECOG: (0) Fully active, able to carry on all predisease performance without restriction  Pain: (on a scale of 0-10)   Pain Score    04/28/25 0751   PainSc: 0-No pain       Objective     Physical Exam:   Vital Signs:   Vitals:    04/28/25 0751   BP: 138/77   Pulse: 58   Resp: 16   Temp: 98.2 °F (36.8 °C)   TempSrc: Oral   SpO2: 98%   Weight: 84.9 kg (187 lb 2.7 oz)   PainSc: 0-No pain     Body mass index is 30.22 kg/m².   Wt Readings from Last 3 Encounters:   04/28/25 84.9 kg (187 lb 2.7 oz)   03/03/25 85.2 kg (187 lb 12.8 oz)   02/18/25 83.9 kg (185 lb)       Physical Exam  Vitals reviewed.   Constitutional:       General: He is not in acute distress.     Appearance: Normal appearance. He is normal weight. He is not ill-appearing.   HENT:      Head: Normocephalic and atraumatic.      Mouth/Throat:      Mouth: Mucous membranes are moist.      Pharynx: Oropharynx is clear. No oropharyngeal exudate or posterior oropharyngeal erythema.   Eyes:      Conjunctiva/sclera: Conjunctivae normal.      Pupils: Pupils are equal, round, and reactive to light.   Pulmonary:      Effort: Pulmonary effort is normal. No respiratory distress.   Musculoskeletal:         General: Normal range of motion.      Cervical back: Normal range of motion.   Skin:     General: Skin is warm and dry.   Neurological:      General: No focal deficit present.      Mental Status: He is alert and oriented to  person, place, and time. Mental status is at baseline.   Psychiatric:         Mood and Affect: Mood normal.         Behavior: Behavior normal.       Result Review: I independently reviewed the following data.   -- PSA Diagnostic (04/23/2025 14:21)   -- PSA Diagnostic (02/10/2025 11:36)   Results  Labs   - PSA: Less than 0.014    Pathology:   Lab Results   Component Value Date    CLININFO  05/09/2024     Elevated prostate specific antigen (PSA)      FINALDX  05/09/2024     1. Prostate gland, left base, needle core biopsy:   - Benign prostatic tissue       2. Prostate gland, left mid, needle core biopsy:   - Benign prostatic tissue       3. Prostate gland, left apex, needle core biopsy:   - Adenocarcinoma, Grade Group 3 (Moy grade 4+3 = score of 7), in 2 of 2 cores, involving 18% of needle core tissue, and measuring 2 mm in length       4. Prostate gland, right base, needle core biopsy:   - Benign prostatic tissue       5. Prostate gland, right mid, needle core biopsy:   - Benign prostatic tissue       6. Prostate gland, right apex, needle core biopsy:   - Adenocarcinoma, Grade Group 1 ([place] grade 3+3 = score of 6), in 1 of 2 cores, involving 3% of needle core tissue, and measuring 1 mm in length       7. Prostate gland, region of interest, needle core biopsy:   - Adenocarcinoma, Grade Group 1 (Moy grade 3+3 = score of 6), in 1 of 2 cores, involving 5% of needle core tissue, and measuring 1 mm in length    REMARKS: The above positive (malignant) diagnosis was called to Emily JONES in Dr. DWAINE oFrtune's office at 14:28 EDT on 5/13/2024 by Geronimo DUEÑAS          Imaging: No radiology results for the last 90 days.     Labs:   WBC   Date Value Ref Range Status   03/01/2024 7.19 3.40 - 10.80 10*3/mm3 Final     Hemoglobin   Date Value Ref Range Status   03/01/2024 15.3 13.0 - 17.7 g/dL Final     Hematocrit   Date Value Ref Range Status   03/01/2024 46.9 37.5 - 51.0 % Final     Platelets   Date Value Ref Range Status    03/01/2024 318 140 - 450 10*3/mm3 Final     Creatinine   Date Value Ref Range Status   01/20/2025 0.80 0.76 - 1.27 mg/dL Final   07/29/2024 0.80 0.60 - 1.30 mg/dL Final     Comment:     Serial Number: [id number]:  [id number]     BUN   Date Value Ref Range Status   01/20/2025 11 8 - 23 mg/dL Final     eGFR   Date Value Ref Range Status   01/20/2025 91.2 >60.0 mL/min/1.73 Final     TSH   Date Value Ref Range Status   01/20/2025 2.880 0.270 - 4.200 uIU/mL Final      PSA   Date Value Ref Range Status   04/23/2025 <0.014 0.000 - 4.000 ng/mL Final   02/10/2025 <0.014 0.000 - 4.000 ng/mL Final   10/28/2024 0.018 0.000 - 4.000 ng/mL Final   03/01/2024 10.000 (H) 0.000 - 4.000 ng/mL Final   05/02/2022 4.790 (H) 0.000 - 4.000 ng/mL Final     Assessment / Plan      Impression: Kian Wakefield is a pleasant 79 y.o. male with cT1c cN0 cM0 adenocarcinoma of the prostate, grade 3, Moy 4+3= 7, iPSA 10 ng/mL. He is status post external beam radiotherapy to the prostate and seminal vesicles, completed on 9/16/2024. Received 7000 cGy in 28 fractions. He has completed 6 months of ADT with Lupron; received final Lupron 22.5 injection on 10/18/2024. He is clinically doing well overall with essentially unchanged urinary function. AUA Symptom Score today of 3.     DC Flomax from med list     Assessment/Plan:   Diagnoses and all orders for this visit:    1. Malignant neoplasm of prostate (Primary)  -     PSA Diagnostic; Future    2. History of external beam radiation therapy    3. Encounter for follow-up surveillance of prostate cancer    4. Hot flashes       Assessment & Plan  1. Prostate cancer.  PSA levels remain undetectable (<0.014), indicating a positive response to radiation therapy and Lupron injection administered in 10/2024. It is anticipated that PSA levels may slightly increase between 10/2025 and 01/2026 as the effects of Lupron diminish and testosterone levels normalize. Hot flashes are a common side effect of Lupron,  typically persisting for about a year post-injection. Vitamin E at a dosage of 800 IU daily is recommended to manage hot flashes and night sweats. If ineffective, prescription medications such as antianxiety or antidepressant medications with off-label use for hot flashes may be considered. Flomax will be discontinued from the medication list.    2. Hot flashes.  Hot flashes are a known side effect of Lupron. Over-the-counter vitamin E at a dosage of 800 IU daily is recommended to help manage the symptoms. If vitamin E does not alleviate the hot flashes, prescription medications such as antianxiety or antidepressant medications with off-label use for hot flashes may be considered.    Follow Up:   Return for follow-up in 6 months with PSA prior.   Follow-up with Urology on 8/18/25.     Return in about 6 months (around 10/28/2025) for Office Visit.  Kian BARR Ashu was encouraged to contact me in the interim with any questions or concerns regarding his care.      TIM Red  Radiation Oncology  [facility]    This document has been signed by TIM Patel on April 28, 2025 08:27 EDT     Patient or patient representative verbalized consent for the use of Ambient Listening during the visit with  TIM Patel for chart documentation. 4/28/2025  08:01 EDT

## 2025-04-28 ENCOUNTER — OFFICE VISIT (OUTPATIENT)
Dept: RADIATION ONCOLOGY | Facility: HOSPITAL | Age: 80
End: 2025-04-28
Payer: MEDICARE

## 2025-04-28 VITALS
SYSTOLIC BLOOD PRESSURE: 138 MMHG | DIASTOLIC BLOOD PRESSURE: 77 MMHG | HEART RATE: 58 BPM | WEIGHT: 187.17 LBS | RESPIRATION RATE: 16 BRPM | OXYGEN SATURATION: 98 % | TEMPERATURE: 98.2 F | BODY MASS INDEX: 30.22 KG/M2

## 2025-04-28 DIAGNOSIS — Z08 ENCOUNTER FOR FOLLOW-UP SURVEILLANCE OF PROSTATE CANCER: ICD-10-CM

## 2025-04-28 DIAGNOSIS — R23.2 HOT FLASHES: ICD-10-CM

## 2025-04-28 DIAGNOSIS — Z92.3 HISTORY OF EXTERNAL BEAM RADIATION THERAPY: ICD-10-CM

## 2025-04-28 DIAGNOSIS — C61 MALIGNANT NEOPLASM OF PROSTATE: Primary | ICD-10-CM

## 2025-04-28 DIAGNOSIS — Z85.46 ENCOUNTER FOR FOLLOW-UP SURVEILLANCE OF PROSTATE CANCER: ICD-10-CM

## 2025-04-28 PROCEDURE — G0463 HOSPITAL OUTPT CLINIC VISIT: HCPCS | Performed by: NURSE PRACTITIONER

## 2025-07-21 ENCOUNTER — OFFICE VISIT (OUTPATIENT)
Dept: FAMILY MEDICINE CLINIC | Age: 80
End: 2025-07-21
Payer: MEDICARE

## 2025-07-21 VITALS
WEIGHT: 185.4 LBS | SYSTOLIC BLOOD PRESSURE: 124 MMHG | OXYGEN SATURATION: 99 % | TEMPERATURE: 97.4 F | BODY MASS INDEX: 29.8 KG/M2 | DIASTOLIC BLOOD PRESSURE: 83 MMHG | HEART RATE: 63 BPM | HEIGHT: 66 IN

## 2025-07-21 DIAGNOSIS — R23.2 HOT FLASHES: ICD-10-CM

## 2025-07-21 DIAGNOSIS — R20.2 NUMBNESS AND TINGLING OF BOTH LEGS: Primary | ICD-10-CM

## 2025-07-21 DIAGNOSIS — R23.9 UNSPECIFIED SKIN CHANGES: ICD-10-CM

## 2025-07-21 DIAGNOSIS — R20.0 NUMBNESS AND TINGLING OF BOTH LEGS: Primary | ICD-10-CM

## 2025-07-21 DIAGNOSIS — C61 MALIGNANT NEOPLASM OF PROSTATE: ICD-10-CM

## 2025-07-21 NOTE — PROGRESS NOTES
"Chief Complaint  Kian Wakefield presents to [facility] for Referral for Specialist Needed  (Pt would [facility] ) and Lower Extremity Issue (Pt states that he has \"pins and needles\" sensation in lower extremities along w/ swelling )    Subjective     History of Present Illness  Miles is in today to follow-up on chronic conditions and would like a referral to Dr. Raffy Levine.  He reports that he has been having pins-and-needles and bilateral lower extremity.  This has been going on for years off and on.  He has never had an MRI of the lumbar spine nor has he had a nerve conduction study.      He is currently followed for history of prostate cancer radiation oncology and Dr. Avila who does not believe this is related to his Lupron therapy as this was stopped last year. He is having hot flashes up to 8 times per day lasting about 4 minutes at a time- this has been going on since last October.  Was advised to start on B12 and Zinc   He wants to figure out what may be causing this     Assessment and Plan     Diagnoses and all orders for this visit:    1. Numbness and tingling of both legs (Primary)  -     EMG & Nerve Conduction Test; Future    2. Hot flashes  Comments:  will check labs   determine if underlying cause  Orders:  -     Cortisol - AM; Future  -     TSH; Future  -     T4, free; Future  -     T4; Future  -     Prolactin; Future  -     CBC & Differential; Future  -     C-reactive protein; Future  -     Urinalysis With Microscopic - Urine, Clean Catch; Future  -     Hepatic Function Panel; Future  -     HIV-1/O/2 Ag/Ab w Reflex; Future  -     QuantiFERON TB Gold (Kit Test); Future    3. Unspecified skin changes  -     TSH; Future  -     T4, free; Future  -     T4; Future    4. Malignant neoplasm of prostate            Follow Up   Return for Pending lab results.  Future Appointments   Date Time Provider " "Department Center   8/5/2025  9:30 AM [facility] BAR[facility] 1 [facility] None   8/18/2025 11:00 AM Fiorella Gomez[place] [facility] [place] [facility]   9/3/2025  8:00 AM Raissa Patiño [facility] [place] [facility] [place]   10/28/2025  8:00 AM Mary Kate Jacobs, [place] [place] [facility] [place]       No orders of the defined types were placed in this encounter.      There are no discontinued medications.       Review of Systems    Objective     Vitals:    07/21/25 0758   BP: 124/83   BP Location: Left arm   Patient Position: Sitting   Cuff Size: Adult   Pulse: 63   Temp: 97.4 °F (36.3 °C)   TempSrc: Temporal   SpO2: 99%   Weight: 84.1 kg (185 lb 6.4 oz)   Height: 167.6 cm (65.98\")         Physical Exam  Vitals reviewed.   Constitutional:       Appearance: Normal appearance.   HENT:      Head: Normocephalic.   Eyes:      Pupils: Pupils are equal, round, and reactive to light.   Cardiovascular:      Rate and Rhythm: Normal rate and regular rhythm.      Heart sounds: No murmur heard.  Pulmonary:      Effort: Pulmonary effort is normal.      Breath sounds: Normal breath sounds.   Musculoskeletal:         General: Normal range of motion.      Right lower leg: Edema present.      Left lower leg: Edema present.   Neurological:      Mental Status: He is alert.   Psychiatric:         Mood and Affect: Mood normal.         Behavior: Behavior normal.               Result Review          Allergies   Allergen Reactions    Levofloxacin Swelling      Past Medical History:   Diagnosis Date    Cancer     prostate    Hyperlipidemia     Hypertension     Malaria 1969    Myocardial infarction 2003    2 stents placement     Current Outpatient Medications   Medication Sig Dispense Refill    aspirin 81 MG EC tablet Take 1 tablet by mouth Daily.      cyanocobalamin (VITAMIN B-12) 1000 MCG tablet Take 1 tablet by mouth Daily.      ezetimibe (ZETIA) 10 MG tablet Take 1 tablet by mouth Daily.      metoprolol tartrate (LOPRESSOR) 25 MG tablet 2 (Two) Times a Day. 1/2 tablet daily "      rosuvastatin (CRESTOR) 40 MG tablet Take 1 tablet by mouth Every Night.      Zinc 50 MG tablet Take 1 tablet by mouth Daily.      losartan-hydrochlorothiazide (HYZAAR) 100-25 MG per tablet Take 1 tablet by mouth Daily. (Patient not taking: Reported on 7/21/2025)       No current facility-administered medications for this visit.     Past Surgical History:   Procedure Laterality Date    CARDIAC CATHETERIZATION      COLONOSCOPY      CORONARY ANGIOPLASTY WITH STENT PLACEMENT      PROSTATE BIOPSY N/A 05/09/2024    Procedure: PROSTATE ULTRASOUND BIOPSY MRI FUSION WITH URONAV;  Surgeon: Levi Avila MD;  Location: [facility] OR;  Service: Urology;  Laterality: N/A;      Health Maintenance Due   Topic Date Due    ZOSTER VACCINE (1 of 2) Never done    COVID-19 Vaccine (7 - 2024-25 season) 09/01/2024      Immunization History   Administered Date(s) Administered    COVID-19 (MODERNA) 1st,2nd,3rd Dose Monovalent 02/04/2021, 03/04/2021, 11/03/2021, 06/09/2022    COVID-19 (MODERNA) BIVALENT 12+YRS 12/01/2022    COVID-19 (PFIZER) 12YRS+ (COMIRNATY) 03/01/2024    Pneumococcal Conjugate 13-Valent (PCV13) 02/22/2022    Pneumococcal Conjugate 20-Valent (PCV20) 02/28/2023    Tdap 02/22/2021         Part of this note may be an electronic transcription/translation of spoken language to printed   text using the Dragon Dictation System.      TIM Schmidt

## 2025-07-22 ENCOUNTER — LAB (OUTPATIENT)
Dept: LAB | Facility: HOSPITAL | Age: 80
End: 2025-07-22
Payer: MEDICARE

## 2025-07-22 DIAGNOSIS — R23.9 UNSPECIFIED SKIN CHANGES: ICD-10-CM

## 2025-07-22 DIAGNOSIS — C61 PROSTATE CANCER: ICD-10-CM

## 2025-07-22 DIAGNOSIS — R23.2 HOT FLASHES: ICD-10-CM

## 2025-07-22 DIAGNOSIS — C61 MALIGNANT NEOPLASM OF PROSTATE: ICD-10-CM

## 2025-07-22 LAB
ALBUMIN SERPL-MCNC: 4.1 G/DL (ref 3.5–5.2)
ALP SERPL-CCNC: 66 U/L (ref 39–117)
ALT SERPL W P-5'-P-CCNC: 17 U/L (ref 1–41)
AST SERPL-CCNC: 20 U/L (ref 1–40)
BACTERIA UR QL AUTO: NORMAL /HPF
BASOPHILS # BLD AUTO: 0.03 10*3/MM3 (ref 0–0.2)
BASOPHILS NFR BLD AUTO: 0.5 % (ref 0–1.5)
BILIRUB CONJ SERPL-MCNC: 0.1 MG/DL (ref 0–0.3)
BILIRUB INDIRECT SERPL-MCNC: 0.3 MG/DL
BILIRUB SERPL-MCNC: 0.4 MG/DL (ref 0–1.2)
BILIRUB UR QL STRIP: NEGATIVE
CLARITY UR: CLEAR
COLOR UR: YELLOW
CORTIS AM PEAK SERPL-MCNC: 15.41 MCG/DL (ref 6.02–18.4)
CRP SERPL-MCNC: <0.3 MG/DL (ref 0–0.5)
DEPRECATED RDW RBC AUTO: 45.3 FL (ref 37–54)
EOSINOPHIL # BLD AUTO: 0.56 10*3/MM3 (ref 0–0.4)
EOSINOPHIL NFR BLD AUTO: 8.9 % (ref 0.3–6.2)
ERYTHROCYTE [DISTWIDTH] IN BLOOD BY AUTOMATED COUNT: 13.3 % (ref 12.3–15.4)
GLUCOSE UR STRIP-MCNC: NEGATIVE MG/DL
HCT VFR BLD AUTO: 39.7 % (ref 37.5–51)
HGB BLD-MCNC: 13 G/DL (ref 13–17.7)
HGB UR QL STRIP.AUTO: ABNORMAL
HIV 1+2 AB+HIV1 P24 AG SERPL QL IA: NORMAL
IMM GRANULOCYTES # BLD AUTO: 0.03 10*3/MM3 (ref 0–0.05)
IMM GRANULOCYTES NFR BLD AUTO: 0.5 % (ref 0–0.5)
KETONES UR QL STRIP: NEGATIVE
LEUKOCYTE ESTERASE UR QL STRIP.AUTO: NEGATIVE
LYMPHOCYTES # BLD AUTO: 2.08 10*3/MM3 (ref 0.7–3.1)
LYMPHOCYTES NFR BLD AUTO: 33.2 % (ref 19.6–45.3)
MCH RBC QN AUTO: 29.8 PG (ref 26.6–33)
MCHC RBC AUTO-ENTMCNC: 32.7 G/DL (ref 31.5–35.7)
MCV RBC AUTO: 91.1 FL (ref 79–97)
MONOCYTES # BLD AUTO: 0.64 10*3/MM3 (ref 0.1–0.9)
MONOCYTES NFR BLD AUTO: 10.2 % (ref 5–12)
NEUTROPHILS NFR BLD AUTO: 2.93 10*3/MM3 (ref 1.7–7)
NEUTROPHILS NFR BLD AUTO: 46.7 % (ref 42.7–76)
NITRITE UR QL STRIP: NEGATIVE
PH UR STRIP.AUTO: 5.5 [PH] (ref 5–8)
PLATELET # BLD AUTO: 295 10*3/MM3 (ref 140–450)
PMV BLD AUTO: 8.5 FL (ref 6–12)
PROLACTIN SERPL-MCNC: 18.8 NG/ML (ref 4.04–15.2)
PROT SERPL-MCNC: 6.8 G/DL (ref 6–8.5)
PROT UR QL STRIP: NEGATIVE
PSA SERPL-MCNC: <0.014 NG/ML (ref 0–4)
RBC # BLD AUTO: 4.36 10*6/MM3 (ref 4.14–5.8)
RBC # UR STRIP: NORMAL /HPF
REF LAB TEST METHOD: NORMAL
SP GR UR STRIP: 1.02 (ref 1–1.03)
SQUAMOUS #/AREA URNS HPF: NORMAL /HPF
T4 FREE SERPL-MCNC: 0.83 NG/DL (ref 0.92–1.68)
T4 SERPL-MCNC: 5.06 MCG/DL (ref 4.5–11.7)
TSH SERPL DL<=0.05 MIU/L-ACNC: 3.98 UIU/ML (ref 0.27–4.2)
UROBILINOGEN UR QL STRIP: ABNORMAL
WBC # UR STRIP: NORMAL /HPF
WBC NRBC COR # BLD AUTO: 6.27 10*3/MM3 (ref 3.4–10.8)

## 2025-07-22 PROCEDURE — 86480 TB TEST CELL IMMUN MEASURE: CPT

## 2025-07-22 PROCEDURE — 80076 HEPATIC FUNCTION PANEL: CPT

## 2025-07-22 PROCEDURE — 84443 ASSAY THYROID STIM HORMONE: CPT

## 2025-07-22 PROCEDURE — 86140 C-REACTIVE PROTEIN: CPT

## 2025-07-22 PROCEDURE — G0432 EIA HIV-1/HIV-2 SCREEN: HCPCS

## 2025-07-22 PROCEDURE — 84153 ASSAY OF PSA TOTAL: CPT

## 2025-07-22 PROCEDURE — 81001 URINALYSIS AUTO W/SCOPE: CPT

## 2025-07-22 PROCEDURE — 36415 COLL VENOUS BLD VENIPUNCTURE: CPT

## 2025-07-22 PROCEDURE — 82533 TOTAL CORTISOL: CPT

## 2025-07-22 PROCEDURE — 84439 ASSAY OF FREE THYROXINE: CPT

## 2025-07-22 PROCEDURE — 85025 COMPLETE CBC W/AUTO DIFF WBC: CPT

## 2025-07-22 PROCEDURE — 84146 ASSAY OF PROLACTIN: CPT

## 2025-07-24 LAB
GAMMA INTERFERON BACKGROUND BLD IA-ACNC: 0.09 IU/ML
M TB IFN-G BLD-IMP: NEGATIVE
M TB IFN-G CD4+ BCKGRND COR BLD-ACNC: 0.08 IU/ML
M TB IFN-G CD4+CD8+ BCKGRND COR BLD-ACNC: 0.08 IU/ML
MITOGEN IGNF BCKGRD COR BLD-ACNC: >10 IU/ML
QUANTIFERON INCUBATION: NORMAL
SERVICE CMNT-IMP: NORMAL

## 2025-08-05 ENCOUNTER — HOSPITAL ENCOUNTER (OUTPATIENT)
Facility: HOSPITAL | Age: 80
Discharge: HOME | End: 2025-08-05
Payer: MEDICARE

## 2025-08-05 ENCOUNTER — LAB (OUTPATIENT)
Dept: LAB | Facility: HOSPITAL | Age: 80
End: 2025-08-05
Payer: MEDICARE

## 2025-08-05 DIAGNOSIS — R20.0 NUMBNESS AND TINGLING OF BOTH LEGS: ICD-10-CM

## 2025-08-05 DIAGNOSIS — R20.2 NUMBNESS AND TINGLING OF BOTH LEGS: ICD-10-CM

## 2025-08-05 DIAGNOSIS — R23.9 UNSPECIFIED SKIN CHANGES: ICD-10-CM

## 2025-08-05 DIAGNOSIS — R23.2 HOT FLASHES: ICD-10-CM

## 2025-08-05 LAB
PROLACTIN SERPL-MCNC: 6.14 NG/ML (ref 4.04–15.2)
T4 FREE SERPL-MCNC: 0.79 NG/DL (ref 0.92–1.68)
TSH SERPL DL<=0.05 MIU/L-ACNC: 2.71 UIU/ML (ref 0.27–4.2)

## 2025-08-05 PROCEDURE — 84443 ASSAY THYROID STIM HORMONE: CPT

## 2025-08-05 PROCEDURE — 95912 NRV CNDJ TEST 11-12 STUDIES: CPT

## 2025-08-05 PROCEDURE — 95886 MUSC TEST DONE W/N TEST COMP: CPT

## 2025-08-05 PROCEDURE — 95886 MUSC TEST DONE W/N TEST COMP: CPT | Performed by: PHYSICAL THERAPIST

## 2025-08-05 PROCEDURE — 36415 COLL VENOUS BLD VENIPUNCTURE: CPT

## 2025-08-05 PROCEDURE — 95912 NRV CNDJ TEST 11-12 STUDIES: CPT | Performed by: PHYSICAL THERAPIST

## 2025-08-05 PROCEDURE — 84146 ASSAY OF PROLACTIN: CPT

## 2025-08-05 PROCEDURE — 84439 ASSAY OF FREE THYROXINE: CPT

## 2025-08-13 ENCOUNTER — OFFICE VISIT (OUTPATIENT)
Dept: FAMILY MEDICINE CLINIC | Age: 80
End: 2025-08-13
Payer: MEDICARE

## 2025-08-13 VITALS
OXYGEN SATURATION: 98 % | BODY MASS INDEX: 29.92 KG/M2 | TEMPERATURE: 98 F | SYSTOLIC BLOOD PRESSURE: 136 MMHG | DIASTOLIC BLOOD PRESSURE: 56 MMHG | HEIGHT: 66 IN | HEART RATE: 56 BPM | WEIGHT: 186.2 LBS

## 2025-08-13 DIAGNOSIS — R23.2 HOT FLASHES: ICD-10-CM

## 2025-08-13 DIAGNOSIS — Z79.899 HIGH RISK MEDICATION USE: ICD-10-CM

## 2025-08-13 DIAGNOSIS — T45.1X5A PERIPHERAL NEUROPATHY DUE TO CHEMOTHERAPY: Primary | ICD-10-CM

## 2025-08-13 DIAGNOSIS — G62.0 PERIPHERAL NEUROPATHY DUE TO CHEMOTHERAPY: Primary | ICD-10-CM

## 2025-08-13 LAB
AMPHET+METHAMPHET UR QL: NEGATIVE
AMPHETAMINES UR QL: NEGATIVE
BARBITURATES UR QL SCN: NEGATIVE
BENZODIAZ UR QL SCN: NEGATIVE
BUPRENORPHINE SERPL-MCNC: NEGATIVE NG/ML
CANNABINOIDS SERPL QL: NEGATIVE
COCAINE UR QL: NEGATIVE
EXPIRATION DATE: NORMAL
Lab: NORMAL
MDMA UR QL SCN: NEGATIVE
METHADONE UR QL SCN: NEGATIVE
MORPHINE/OPIATES SCREEN, URINE: NEGATIVE
OXYCODONE UR QL SCN: NEGATIVE
PCP UR QL SCN: NEGATIVE

## 2025-08-18 ENCOUNTER — OFFICE VISIT (OUTPATIENT)
Dept: UROLOGY | Age: 80
End: 2025-08-18
Payer: MEDICARE

## 2025-08-18 VITALS — WEIGHT: 186 LBS | BODY MASS INDEX: 29.89 KG/M2 | RESPIRATION RATE: 12 BRPM | HEIGHT: 66 IN

## 2025-08-18 DIAGNOSIS — R35.1 NOCTURIA: ICD-10-CM

## 2025-08-18 DIAGNOSIS — R35.0 FREQUENCY OF URINATION: ICD-10-CM

## 2025-08-18 DIAGNOSIS — C61 PROSTATE CANCER: Primary | ICD-10-CM

## 2025-08-18 LAB — URINE VOLUME: 0

## 2025-08-18 PROCEDURE — 1160F RVW MEDS BY RX/DR IN RCRD: CPT | Performed by: NURSE PRACTITIONER

## 2025-08-18 PROCEDURE — 1159F MED LIST DOCD IN RCRD: CPT | Performed by: NURSE PRACTITIONER

## 2025-08-18 PROCEDURE — 99213 OFFICE O/P EST LOW 20 MIN: CPT | Performed by: NURSE PRACTITIONER

## 2025-08-20 ENCOUNTER — TELEPHONE (OUTPATIENT)
Dept: FAMILY MEDICINE CLINIC | Age: 80
End: 2025-08-20
Payer: MEDICARE

## 2025-08-20 RX ORDER — GABAPENTIN 100 MG/1
100 CAPSULE ORAL 2 TIMES DAILY
Qty: 60 CAPSULE | Refills: 1 | Status: SHIPPED | OUTPATIENT
Start: 2025-08-20

## (undated) DEVICE — SHEET,DRAPE,70X85,STERILE: Brand: MEDLINE

## (undated) DEVICE — PAD,SANITARY,11 IN,MAXI,N-STRL,IND WRAP: Brand: MEDLINE

## (undated) DEVICE — GLV SURG SENSICARE W/ALOE PF LF 6.5 STRL

## (undated) DEVICE — SKIN PREP TRAY W/CHG: Brand: MEDLINE INDUSTRIES, INC.

## (undated) DEVICE — DRSNG SURESITE123 8X12IN

## (undated) DEVICE — MAX-CORE® DISPOSABLE CORE BIOPSY INSTRUMENT, 18G X 25CM: Brand: MAX-CORE

## (undated) DEVICE — TOWEL,OR,DSP,ST,BLUE,STD,4/PK,20PK/CS: Brand: MEDLINE

## (undated) DEVICE — DRSNG TELFA PAD NONADH STR 1S 3X4IN

## (undated) DEVICE — MARKER,SKIN,WI/RULER AND LABELS: Brand: MEDLINE